# Patient Record
Sex: FEMALE | Race: WHITE | NOT HISPANIC OR LATINO
[De-identification: names, ages, dates, MRNs, and addresses within clinical notes are randomized per-mention and may not be internally consistent; named-entity substitution may affect disease eponyms.]

---

## 2017-01-23 ENCOUNTER — APPOINTMENT (OUTPATIENT)
Dept: PULMONOLOGY | Facility: CLINIC | Age: 62
End: 2017-01-23

## 2017-01-23 VITALS
HEART RATE: 62 BPM | DIASTOLIC BLOOD PRESSURE: 60 MMHG | BODY MASS INDEX: 29.82 KG/M2 | SYSTOLIC BLOOD PRESSURE: 102 MMHG | TEMPERATURE: 98.1 F | WEIGHT: 179 LBS | HEIGHT: 65 IN | RESPIRATION RATE: 14 BRPM | OXYGEN SATURATION: 98 %

## 2017-01-23 DIAGNOSIS — F51.04 PSYCHOPHYSIOLOGIC INSOMNIA: ICD-10-CM

## 2017-03-08 ENCOUNTER — APPOINTMENT (OUTPATIENT)
Dept: SLEEP CENTER | Facility: CLINIC | Age: 62
End: 2017-03-08

## 2017-04-28 ENCOUNTER — APPOINTMENT (OUTPATIENT)
Dept: SLEEP CENTER | Facility: CLINIC | Age: 62
End: 2017-04-28

## 2017-04-28 ENCOUNTER — OUTPATIENT (OUTPATIENT)
Dept: OUTPATIENT SERVICES | Facility: HOSPITAL | Age: 62
LOS: 1 days | End: 2017-04-28
Payer: COMMERCIAL

## 2017-04-28 PROCEDURE — G0399: CPT

## 2017-05-02 ENCOUNTER — RESULT REVIEW (OUTPATIENT)
Age: 62
End: 2017-05-02

## 2017-05-02 DIAGNOSIS — G47.33 OBSTRUCTIVE SLEEP APNEA (ADULT) (PEDIATRIC): ICD-10-CM

## 2017-05-18 ENCOUNTER — APPOINTMENT (OUTPATIENT)
Dept: SLEEP CENTER | Facility: CLINIC | Age: 62
End: 2017-05-18

## 2017-06-05 ENCOUNTER — APPOINTMENT (OUTPATIENT)
Dept: SLEEP CENTER | Facility: CLINIC | Age: 62
End: 2017-06-05

## 2017-06-26 ENCOUNTER — RESULT REVIEW (OUTPATIENT)
Age: 62
End: 2017-06-26

## 2017-10-26 ENCOUNTER — APPOINTMENT (OUTPATIENT)
Dept: PULMONOLOGY | Facility: CLINIC | Age: 62
End: 2017-10-26

## 2017-11-27 ENCOUNTER — APPOINTMENT (OUTPATIENT)
Dept: PULMONOLOGY | Facility: CLINIC | Age: 62
End: 2017-11-27
Payer: COMMERCIAL

## 2017-11-27 VITALS
SYSTOLIC BLOOD PRESSURE: 116 MMHG | DIASTOLIC BLOOD PRESSURE: 66 MMHG | HEART RATE: 65 BPM | RESPIRATION RATE: 15 BRPM | WEIGHT: 184 LBS | TEMPERATURE: 98.3 F | BODY MASS INDEX: 33.86 KG/M2 | HEIGHT: 62 IN

## 2017-11-27 DIAGNOSIS — G47.33 OBSTRUCTIVE SLEEP APNEA (ADULT) (PEDIATRIC): ICD-10-CM

## 2017-11-27 PROCEDURE — 99214 OFFICE O/P EST MOD 30 MIN: CPT | Mod: GC

## 2018-03-05 ENCOUNTER — RESULT REVIEW (OUTPATIENT)
Age: 63
End: 2018-03-05

## 2018-05-25 ENCOUNTER — EMERGENCY (EMERGENCY)
Facility: HOSPITAL | Age: 63
LOS: 1 days | Discharge: HOME | End: 2018-05-25
Admitting: EMERGENCY MEDICINE
Payer: COMMERCIAL

## 2018-05-25 PROCEDURE — 74019 RADEX ABDOMEN 2 VIEWS: CPT | Mod: 26

## 2018-05-25 PROCEDURE — 74019 RADEX ABDOMEN 2 VIEWS: CPT

## 2018-05-25 PROCEDURE — 99283 EMERGENCY DEPT VISIT LOW MDM: CPT

## 2018-06-01 ENCOUNTER — EMERGENCY (EMERGENCY)
Facility: HOSPITAL | Age: 63
LOS: 1 days | Discharge: HOME | End: 2018-06-01
Attending: EMERGENCY MEDICINE
Payer: COMMERCIAL

## 2018-06-01 VITALS
TEMPERATURE: 98 F | OXYGEN SATURATION: 97 % | DIASTOLIC BLOOD PRESSURE: 81 MMHG | HEART RATE: 95 BPM | SYSTOLIC BLOOD PRESSURE: 143 MMHG | RESPIRATION RATE: 16 BRPM

## 2018-06-01 VITALS
HEIGHT: 62 IN | DIASTOLIC BLOOD PRESSURE: 88 MMHG | RESPIRATION RATE: 17 BRPM | HEART RATE: 92 BPM | TEMPERATURE: 98 F | WEIGHT: 179.9 LBS | SYSTOLIC BLOOD PRESSURE: 133 MMHG | OXYGEN SATURATION: 98 %

## 2018-06-01 LAB
ALBUMIN SERPL ELPH-MCNC: 4.6 G/DL — SIGNIFICANT CHANGE UP (ref 3.3–5)
ALP SERPL-CCNC: 69 U/L — SIGNIFICANT CHANGE UP (ref 40–120)
ALT FLD-CCNC: 16 U/L — SIGNIFICANT CHANGE UP (ref 10–45)
ANION GAP SERPL CALC-SCNC: 14 MMOL/L — SIGNIFICANT CHANGE UP (ref 5–17)
APPEARANCE UR: CLEAR — SIGNIFICANT CHANGE UP
AST SERPL-CCNC: 20 U/L — SIGNIFICANT CHANGE UP (ref 10–40)
BASOPHILS # BLD AUTO: 0.1 K/UL — SIGNIFICANT CHANGE UP (ref 0–0.2)
BASOPHILS NFR BLD AUTO: 1.2 % — SIGNIFICANT CHANGE UP (ref 0–2)
BILIRUB SERPL-MCNC: 0.3 MG/DL — SIGNIFICANT CHANGE UP (ref 0.2–1.2)
BILIRUB UR-MCNC: NEGATIVE — SIGNIFICANT CHANGE UP
BUN SERPL-MCNC: 7 MG/DL — SIGNIFICANT CHANGE UP (ref 7–23)
CALCIUM SERPL-MCNC: 10.3 MG/DL — SIGNIFICANT CHANGE UP (ref 8.4–10.5)
CHLORIDE SERPL-SCNC: 96 MMOL/L — SIGNIFICANT CHANGE UP (ref 96–108)
CO2 SERPL-SCNC: 26 MMOL/L — SIGNIFICANT CHANGE UP (ref 22–31)
COLOR SPEC: YELLOW — SIGNIFICANT CHANGE UP
CREAT SERPL-MCNC: 0.81 MG/DL — SIGNIFICANT CHANGE UP (ref 0.5–1.3)
DIFF PNL FLD: NEGATIVE — SIGNIFICANT CHANGE UP
EOSINOPHIL # BLD AUTO: 0 K/UL — SIGNIFICANT CHANGE UP (ref 0–0.5)
EOSINOPHIL NFR BLD AUTO: 0.7 % — SIGNIFICANT CHANGE UP (ref 0–6)
GAS PNL BLDV: SIGNIFICANT CHANGE UP
GLUCOSE SERPL-MCNC: 113 MG/DL — HIGH (ref 70–99)
GLUCOSE UR QL: NEGATIVE — SIGNIFICANT CHANGE UP
HCT VFR BLD CALC: 40.4 % — SIGNIFICANT CHANGE UP (ref 34.5–45)
HGB BLD-MCNC: 13.5 G/DL — SIGNIFICANT CHANGE UP (ref 11.5–15.5)
KETONES UR-MCNC: NEGATIVE — SIGNIFICANT CHANGE UP
LEUKOCYTE ESTERASE UR-ACNC: NEGATIVE — SIGNIFICANT CHANGE UP
LYMPHOCYTES # BLD AUTO: 1.4 K/UL — SIGNIFICANT CHANGE UP (ref 1–3.3)
LYMPHOCYTES # BLD AUTO: 21.9 % — SIGNIFICANT CHANGE UP (ref 13–44)
MCHC RBC-ENTMCNC: 31.5 PG — SIGNIFICANT CHANGE UP (ref 27–34)
MCHC RBC-ENTMCNC: 33.4 GM/DL — SIGNIFICANT CHANGE UP (ref 32–36)
MCV RBC AUTO: 94.4 FL — SIGNIFICANT CHANGE UP (ref 80–100)
MONOCYTES # BLD AUTO: 0.4 K/UL — SIGNIFICANT CHANGE UP (ref 0–0.9)
MONOCYTES NFR BLD AUTO: 7 % — SIGNIFICANT CHANGE UP (ref 2–14)
NEUTROPHILS # BLD AUTO: 4.4 K/UL — SIGNIFICANT CHANGE UP (ref 1.8–7.4)
NEUTROPHILS NFR BLD AUTO: 69.1 % — SIGNIFICANT CHANGE UP (ref 43–77)
NITRITE UR-MCNC: NEGATIVE — SIGNIFICANT CHANGE UP
PH UR: 7 — SIGNIFICANT CHANGE UP (ref 5–8)
PLATELET # BLD AUTO: 272 K/UL — SIGNIFICANT CHANGE UP (ref 150–400)
POTASSIUM SERPL-MCNC: 4.4 MMOL/L — SIGNIFICANT CHANGE UP (ref 3.5–5.3)
POTASSIUM SERPL-SCNC: 4.4 MMOL/L — SIGNIFICANT CHANGE UP (ref 3.5–5.3)
PROT SERPL-MCNC: 8 G/DL — SIGNIFICANT CHANGE UP (ref 6–8.3)
PROT UR-MCNC: NEGATIVE — SIGNIFICANT CHANGE UP
RBC # BLD: 4.28 M/UL — SIGNIFICANT CHANGE UP (ref 3.8–5.2)
RBC # FLD: 11.2 % — SIGNIFICANT CHANGE UP (ref 10.3–14.5)
SODIUM SERPL-SCNC: 136 MMOL/L — SIGNIFICANT CHANGE UP (ref 135–145)
SP GR SPEC: 1 — LOW (ref 1.01–1.02)
UROBILINOGEN FLD QL: NEGATIVE — SIGNIFICANT CHANGE UP
WBC # BLD: 6.4 K/UL — SIGNIFICANT CHANGE UP (ref 3.8–10.5)
WBC # FLD AUTO: 6.4 K/UL — SIGNIFICANT CHANGE UP (ref 3.8–10.5)

## 2018-06-01 PROCEDURE — 80053 COMPREHEN METABOLIC PANEL: CPT

## 2018-06-01 PROCEDURE — 84295 ASSAY OF SERUM SODIUM: CPT

## 2018-06-01 PROCEDURE — 82330 ASSAY OF CALCIUM: CPT

## 2018-06-01 PROCEDURE — 83735 ASSAY OF MAGNESIUM: CPT

## 2018-06-01 PROCEDURE — 83605 ASSAY OF LACTIC ACID: CPT

## 2018-06-01 PROCEDURE — 84443 ASSAY THYROID STIM HORMONE: CPT

## 2018-06-01 PROCEDURE — 82947 ASSAY GLUCOSE BLOOD QUANT: CPT

## 2018-06-01 PROCEDURE — 74177 CT ABD & PELVIS W/CONTRAST: CPT | Mod: 26

## 2018-06-01 PROCEDURE — 85014 HEMATOCRIT: CPT

## 2018-06-01 PROCEDURE — 84132 ASSAY OF SERUM POTASSIUM: CPT

## 2018-06-01 PROCEDURE — 74177 CT ABD & PELVIS W/CONTRAST: CPT

## 2018-06-01 PROCEDURE — 81003 URINALYSIS AUTO W/O SCOPE: CPT

## 2018-06-01 PROCEDURE — 82803 BLOOD GASES ANY COMBINATION: CPT

## 2018-06-01 PROCEDURE — 99284 EMERGENCY DEPT VISIT MOD MDM: CPT

## 2018-06-01 PROCEDURE — 85027 COMPLETE CBC AUTOMATED: CPT

## 2018-06-01 PROCEDURE — 87086 URINE CULTURE/COLONY COUNT: CPT

## 2018-06-01 PROCEDURE — 82435 ASSAY OF BLOOD CHLORIDE: CPT

## 2018-06-01 RX ORDER — SENNA PLUS 8.6 MG/1
2 TABLET ORAL ONCE
Qty: 0 | Refills: 0 | Status: COMPLETED | OUTPATIENT
Start: 2018-06-01 | End: 2018-06-01

## 2018-06-01 RX ORDER — SODIUM CHLORIDE 9 MG/ML
1000 INJECTION INTRAMUSCULAR; INTRAVENOUS; SUBCUTANEOUS ONCE
Qty: 0 | Refills: 0 | Status: COMPLETED | OUTPATIENT
Start: 2018-06-01 | End: 2018-06-01

## 2018-06-01 RX ORDER — POLYETHYLENE GLYCOL 3350 17 G/17G
17 POWDER, FOR SOLUTION ORAL ONCE
Qty: 0 | Refills: 0 | Status: COMPLETED | OUTPATIENT
Start: 2018-06-01 | End: 2018-06-01

## 2018-06-01 RX ADMIN — POLYETHYLENE GLYCOL 3350 17 GRAM(S): 17 POWDER, FOR SOLUTION ORAL at 17:09

## 2018-06-01 RX ADMIN — SODIUM CHLORIDE 1000 MILLILITER(S): 9 INJECTION INTRAMUSCULAR; INTRAVENOUS; SUBCUTANEOUS at 11:56

## 2018-06-01 RX ADMIN — SENNA PLUS 2 TABLET(S): 8.6 TABLET ORAL at 17:08

## 2018-06-01 NOTE — ED PROVIDER NOTE - PROGRESS NOTE DETAILS
Dr. Wills: Surgery feels exam findings not consistent with acute appy. TSH send out. Will need to be followed up as OP. Abnormals called back by admin PA. Advised to start 2 tabs senna QHS. GI FU.

## 2018-06-01 NOTE — CONSULT NOTE ADULT - ASSESSMENT
Patient is a 62 year old female with what appears to be pelvic wall descent. CT findings reviewed with Dr. Shameka Orellana and findings as well as clinical picture are more consistent with pelvic wall decent and less likely to be appendicitis.  -follow up with Dr. Morrison as an outpatient. Call [phone]   -no surgical intervention at this time.

## 2018-06-01 NOTE — ED ADULT NURSE NOTE - OBJECTIVE STATEMENT
61 yo F arrived ambulatory from triage c/o of  abdominal pain PMH HLD, Chronic back pain. Reports abdominal cramping. Denies NVD. No  distress. No CP dizziness weakness or SOB. BS sounds + All 4Q Abdomen soft, nontender, nondistended. Reports constipation x 19 days. With minimal to no stools despite multiple laxatives. Pt reports decrease Po intake. IV line placed. Labs drawn, and sent.

## 2018-06-01 NOTE — ED PROVIDER NOTE - PLAN OF CARE
Your CT scan did not show any bowel obstruction, but it did show what appears to be pelvic wall decent and less likely appendicitis. Please follow up with Dr. Morrison as an outpatient. Please call [phone] to make an appointment for further management.   For your constipation, please take Senna 2 tabs at bedtime daily, Miralax 17gr up to 3 times a day as needed, Dulcolax 15mg daily. Please also follow a high fiber diet as well. Your CT scan did not show any bowel obstruction, but it did show what appears to be pelvic wall decent and less likely appendicitis. Please follow up with Dr. Morrison as an outpatient. Please call [phone] to make an appointment for further management.   For your constipation, please take Senna 2 tabs at bedtime daily, Miralax 17gr up to 3 times a day as needed, Dulcolax 15mg daily. Please also follow a high fiber diet as well. You can use warm water enemas or fleet enemas as needed.

## 2018-06-01 NOTE — ED ADULT NURSE NOTE - CHPI ED SYMPTOMS NEG
no chills/no abdominal distension/no fever/no hematuria/no nausea/no blood in stool/no diarrhea/no dysuria/no burning urination/no vomiting

## 2018-06-01 NOTE — ED ADULT TRIAGE NOTE - CHIEF COMPLAINT QUOTE
constipated last week after 12 days seen at [place] and was able to have a BM after that, now shes been constipated for 1 week

## 2018-06-01 NOTE — ED PROVIDER NOTE - CARE PLAN
Principal Discharge DX:	Constipation Principal Discharge DX:	Constipation  Assessment and plan of treatment:	Your CT scan did not show any bowel obstruction, but it did show what appears to be pelvic wall decent and less likely appendicitis. Please follow up with Dr. Morrison as an outpatient. Please call [phone] to make an appointment for further management.   For your constipation, please take Senna 2 tabs at bedtime daily, Miralax 17gr up to 3 times a day as needed, Dulcolax 15mg daily. Please also follow a high fiber diet as well. Principal Discharge DX:	Constipation  Assessment and plan of treatment:	Your CT scan did not show any bowel obstruction, but it did show what appears to be pelvic wall decent and less likely appendicitis. Please follow up with Dr. Morrison as an outpatient. Please call [phone] to make an appointment for further management.   For your constipation, please take Senna 2 tabs at bedtime daily, Miralax 17gr up to 3 times a day as needed, Dulcolax 15mg daily. Please also follow a high fiber diet as well. You can use warm water enemas or fleet enemas as needed.

## 2018-06-01 NOTE — ED PROVIDER NOTE - PMH
Anxiety    Chronic back pain    GERD (gastroesophageal reflux disease)    Hyperlipidemia    PAM (obstructive sleep apnea)

## 2018-06-01 NOTE — ED PROVIDER NOTE - OBJECTIVE STATEMENT
62y F hx of 63 y/o F hx of Anxiety, GERD, HLD, sleep apnea, chronic back pain and anxiety p/w constipation.  Patient reports having severe constipation for the last 19 days. Patient reports taking up to 5 laxatives family for the first 12 days. These included prune juice, dulcolax, miralax and glcerin tabs. Reports no to minimal BMs during this time. She went to the [facility] on 5/25 who gave did and abd xray showing moderate stool burden. They gave her an enema and a colonoscopy bowel prep which she reports passed through her, but with out relieving her constipation.   Patient reports over the last week, she hasn't been taking the stool softeners regularly because she feels they haven't been working. Reports decreased PO this past week due to fullness and nausea. Does acknowledge passing a few small balls of stool.   No vomiting, F/C, CP, SOB, LE swelling.   Reports increased urination as well. No melena or hematemesis.

## 2018-06-01 NOTE — CONSULT NOTE ADULT - SUBJECTIVE AND OBJECTIVE BOX
GENERAL SURGERY CONSULT NOTE     CC: 62y old Female admitted with a chief complaint of constipation    HPI: This patient is a 62y old Female presenting with constipation x 12 days and 7-9 days of decreased po intake. She has a history of constipation for several years, but never this severe. Patient visited an outside hospital earlier this week where she was treated with enemas and laxatives. The patient had liquid bowel movements consisting of only the laxatives. She denies weight loss, nausea, vomiting, fevers and chills.     PMHx:   GERD  chronic pain  Anxiety  PAM  Hyperlipidemia      PSHx: none    Medications (home): Medications (inpatient): polyethylene glycol 3350 17 Gram(s) Oral Once  senna 2 Tablet(s) Oral once  sorbitol 70%/mineral oil/magnesium hydroxide/glycerin Enema 120 milliLiter(s) Rectal Once    Medications (PRN):  Allergies    No Known Allergies  Intolerances      Social Hx:     Physical Exam  T(C): 36.9 (18 @ 10:42), Max: 36.9 (18 @ 10:42)  HR: 92 (18 @ 10:42) (92 - 92)  BP: 133/88 (18 @ 10:42) (133/88 - 133/88)  RR: 17 (18 @ 10:42) (17 - 17)  SpO2: 98% (18 @ 10:42) (98% - 98%)  Tmax: T(C): , Max: 36.9 (18 @ 10:42)      General: well developed, obese, NAD  Neuro: alert and oriented, no focal deficits, moves all extremities spontaneously  HEENT: NCAT, EOMI, anicteric, mucosa moist  Respiratory: airway patent, respirations unlabored  CVS: regular rate and rhythm  Abdomen: soft, tender in the suprapubic area, nontympanic  Extremities: no edema, sensation and movement grossly intact  Skin: warm, dry, appropriate color    Labs:                        13.5   6.4   )-----------( 272      ( 2018 11:43 )             40.4           136  |  96  |  7   ----------------------------<  113<H>  4.4   |  26  |  0.81    Ca    10.3      2018 11:43  Mg     2.1         TPro  8.0  /  Alb  4.6  /  TBili  0.3  /  DBili  x   /  AST  20  /  ALT  16  /  AlkPhos  69      Urinalysis Basic - ( 2018 11:53 )    Color: Yellow / Appearance: Clear / S.005 / pH: x  Gluc: x / Ketone: Negative  / Bili: Negative / Urobili: Negative   Blood: x / Protein: Negative / Nitrite: Negative   Leuk Esterase: Negative / RBC: x / WBC x   Sq Epi: x / Non Sq Epi: x / Bacteria: x            Imaging and other studies:  < from: CT Abdomen and Pelvis w/ Oral Cont and w/ IV Cont (18 @ 13:38) >  INTERPRETATION:  CLINICAL INFORMATION: Abdominal pain, suprapubic and in   the right lower quadrant.     COMPARISON: None.    PROCEDURE:   CT of the Abdomen and Pelvis was performed with intravenous contrast.   Intravenous contrast: 90 ml Omnipaque 350. 10 ml discarded.  Oral contrast: positive contrast was administered.  Sagittal and coronal reformats were performed.    FINDINGS:    LOWER CHEST: The visualized lung bases are clear.        LIVER: Small cyst at the dome.      BILE DUCTS: Normal caliber.  GALLBLADDER: Unremarkable.  SPLEEN: Unremarkable.  PANCREAS: Unremarkable.  ADRENALS: Unremarkable.  KIDNEYS/URETERS: No hydronephrosis.  Nofocal lesion.    BLADDER: Within normal limits.  REPRODUCTIVE ORGANS: The uterus and adnexa are within normal limits.    BOWEL: Normal in course and caliber without obstruction. An appendicolith   at the tip, which is fluid-filled and borderline in size at 7 mm.   Question of mild adjacent fatty stranding. Small hiatal hernia.  PERITONEUM/RETROPERITONEUM: No pneumoperitoneum, ascites, or   lymphadenopathy.  VESSELS:  No evidence of aortic aneurysm.      BONES/SOFT TISSUES: Osteopenia and degenerative changes. Significant   compression fracture of L1 with associated retropulsion into the spinal   canal. Compression fracture of L3 also with minimal retropulsion..    IMPRESSION:  Give focal pain in the right lower quadrant pain, findings are suggestive   of mild tip appendicitis with an associated appendicolith.  Compression fractures of L1 and L3 with associated retropulsion are   probably nonacute.  Findings discussed with DR. ROBERTS on 2018 1:59 PM with read back.                BOUCHRA GUTIERREZ M.D. ATTENDING RADIOLOGIST  This document has been electronically signed. 2018  2:01PM    < end of copied text >

## 2018-06-01 NOTE — ED PROVIDER NOTE - PHYSICAL EXAMINATION
PHYSICAL EXAM:    GENERAL: Comfortable, no acute distress   HEAD:  Normocephalic, atraumatic  EYES: EOMI, PERRLA  HEENT: Moist mucous membranes  NECK: Supple, No JVD  NERVOUS SYSTEM:  Alert & Oriented X3, Motor Strength 5/5 B/L upper and lower extremities  CHEST/LUNG: Clear to auscultation bilaterally  HEART: Regular rate and rhythm, + 2/6 systolic murmur   ABDOMEN: Soft, Bowel sounds present, +distension, + diffuse tenderness   EXTREMITIES:   No clubbing, cyanosis, or edema  MUSCULOSKELETAL: No muscle tenderness, no joint tenderness  SKIN:  warm and dry, no rash

## 2018-06-01 NOTE — ED ADULT NURSE NOTE - CHIEF COMPLAINT QUOTE
constipated last week after 12 days seen at [place] and was able to have a BM after that, now shes been constipated for 1 week

## 2018-06-01 NOTE — ED PROVIDER NOTE - ATTENDING CONTRIBUTION TO CARE
62y F hx of GERD, chronic back pain, HLD, anxiety here with co constipation. Pt states that seen PCP and seen at  ED for same. Visit to  ED was 5/25, prompted by no BM in 12 days. Had Abd XR and fleet enema, Golytely and had soft BM. Was advised to FU with GI however she cannot get appt until end of June. States that she continues to struggle w constipation despite daily Miralax, colace, bisacodyl. Reports drinking adequate fluids however dec appetite. Reports abd bloating and discomfort with eating. Has hx of  OAB but states worse lately. No weight loss. No perianal anesthesia.   Gen: WNWD NAD  HEENT: NCAT PERRL EOMI normal pharynx  Neck: supple  CV: RRR, no murmur  Lung: CTA BL  Abd: +BS soft ND suprapubic and R lower abd TTP  Ext: wwp, palp pulses, FROMx4, no cce  Neuro: A&Ox3, CN grossly intact, sensation intact, motor 5/5 throughout  Rectal: performed by resident and supervised by me, no hard stool in vault, some soft stool high up   AP: 62y F hx of GERD, chronic back pain, HLD, anxiety here with co constipation. Check labs is TSH and Calcium, lytes. IVF. CTAP due to early satiety, dec appetite, abd pain, constipation, r/o mass/obstruction. UA for UTI. Re-eval.

## 2018-06-02 LAB
CULTURE RESULTS: SIGNIFICANT CHANGE UP
SPECIMEN SOURCE: SIGNIFICANT CHANGE UP

## 2018-06-05 ENCOUNTER — APPOINTMENT (OUTPATIENT)
Dept: UROGYNECOLOGY | Facility: CLINIC | Age: 63
End: 2018-06-05
Payer: COMMERCIAL

## 2018-06-05 DIAGNOSIS — Z83.42 FAMILY HISTORY OF FAMILIAL HYPERCHOLESTEROLEMIA: ICD-10-CM

## 2018-06-05 DIAGNOSIS — Q44.6 CYSTIC DISEASE OF LIVER: ICD-10-CM

## 2018-06-05 DIAGNOSIS — Z81.8 FAMILY HISTORY OF OTHER MENTAL AND BEHAVIORAL DISORDERS: ICD-10-CM

## 2018-06-05 DIAGNOSIS — Z83.1 FAMILY HISTORY OF OTHER INFECTIOUS AND PARASITIC DISEASES: ICD-10-CM

## 2018-06-05 DIAGNOSIS — Z83.49 FAMILY HISTORY OF OTHER ENDOCRINE, NUTRITIONAL AND METABOLIC DISEASES: ICD-10-CM

## 2018-06-05 DIAGNOSIS — Z87.09 PERSONAL HISTORY OF OTHER DISEASES OF THE RESPIRATORY SYSTEM: ICD-10-CM

## 2018-06-05 DIAGNOSIS — Z86.59 PERSONAL HISTORY OF OTHER MENTAL AND BEHAVIORAL DISORDERS: ICD-10-CM

## 2018-06-05 DIAGNOSIS — I38 ENDOCARDITIS, VALVE UNSPECIFIED: ICD-10-CM

## 2018-06-05 PROCEDURE — 99204 OFFICE O/P NEW MOD 45 MIN: CPT

## 2018-06-05 RX ORDER — ATORVASTATIN CALCIUM 20 MG/1
20 TABLET, FILM COATED ORAL
Qty: 90 | Refills: 0 | Status: DISCONTINUED | COMMUNITY
Start: 2018-03-05

## 2018-06-05 RX ORDER — CLOTRIMAZOLE AND BETAMETHASONE DIPROPIONATE 10; .5 MG/G; MG/G
1-0.05 CREAM TOPICAL
Qty: 45 | Refills: 0 | Status: DISCONTINUED | COMMUNITY
Start: 2018-03-05

## 2018-06-05 RX ORDER — ATORVASTATIN CALCIUM 40 MG/1
40 TABLET, FILM COATED ORAL
Qty: 90 | Refills: 0 | Status: DISCONTINUED | COMMUNITY
Start: 2018-03-16

## 2018-06-07 ENCOUNTER — RESULT REVIEW (OUTPATIENT)
Age: 63
End: 2018-06-07

## 2018-06-07 ENCOUNTER — MEDICATION RENEWAL (OUTPATIENT)
Age: 63
End: 2018-06-07

## 2018-06-07 LAB
BACTERIA UR CULT: ABNORMAL
BILIRUB UR QL STRIP: NORMAL
CLARITY UR: CLEAR
COLLECTION METHOD: NORMAL
GLUCOSE UR-MCNC: NORMAL
HCG UR QL: 0.2 EU/DL
HGB UR QL STRIP.AUTO: NORMAL
KETONES UR-MCNC: NORMAL
LEUKOCYTE ESTERASE UR QL STRIP: NORMAL
NITRITE UR QL STRIP: NORMAL
PH UR STRIP: 5
PROT UR STRIP-MCNC: NORMAL
SP GR UR STRIP: 1.02

## 2018-06-14 ENCOUNTER — APPOINTMENT (OUTPATIENT)
Dept: SURGERY | Facility: CLINIC | Age: 63
End: 2018-06-14
Payer: COMMERCIAL

## 2018-06-14 VITALS
TEMPERATURE: 98.4 F | OXYGEN SATURATION: 98 % | HEIGHT: 62 IN | RESPIRATION RATE: 15 BRPM | BODY MASS INDEX: 32.2 KG/M2 | HEART RATE: 78 BPM | WEIGHT: 175 LBS

## 2018-06-14 VITALS — SYSTOLIC BLOOD PRESSURE: 159 MMHG | DIASTOLIC BLOOD PRESSURE: 83 MMHG

## 2018-06-14 DIAGNOSIS — Z83.79 FAMILY HISTORY OF OTHER DISEASES OF THE DIGESTIVE SYSTEM: ICD-10-CM

## 2018-06-14 DIAGNOSIS — Z82.62 FAMILY HISTORY OF OSTEOPOROSIS: ICD-10-CM

## 2018-06-14 DIAGNOSIS — K38.9 DISEASE OF APPENDIX, UNSPECIFIED: ICD-10-CM

## 2018-06-14 DIAGNOSIS — Z82.49 FAMILY HISTORY OF ISCHEMIC HEART DISEASE AND OTHER DISEASES OF THE CIRCULATORY SYSTEM: ICD-10-CM

## 2018-06-14 DIAGNOSIS — Z80.9 FAMILY HISTORY OF MALIGNANT NEOPLASM, UNSPECIFIED: ICD-10-CM

## 2018-06-14 PROBLEM — Z78.9 RARELY CONSUMES ALCOHOL: Status: ACTIVE | Noted: 2018-06-14

## 2018-06-14 PROBLEM — Z87.898 HISTORY OF HEADACHE: Status: RESOLVED | Noted: 2018-06-14 | Resolved: 2018-06-14

## 2018-06-14 PROBLEM — Z87.440 HISTORY OF URINARY TRACT INFECTION: Status: RESOLVED | Noted: 2018-06-05 | Resolved: 2018-06-14

## 2018-06-14 PROBLEM — R42 DIZZINESS: Status: ACTIVE | Noted: 2018-06-14

## 2018-06-14 PROBLEM — R32 URINARY INCONTINENCE: Status: ACTIVE | Noted: 2018-06-14

## 2018-06-14 PROCEDURE — 99204 OFFICE O/P NEW MOD 45 MIN: CPT

## 2018-06-14 RX ORDER — AMPICILLIN 500 MG/1
500 CAPSULE ORAL
Qty: 14 | Refills: 0 | Status: DISCONTINUED | COMMUNITY
Start: 2018-06-07 | End: 2018-06-14

## 2018-06-14 RX ORDER — SIMVASTATIN 80 MG/1
TABLET, FILM COATED ORAL
Refills: 0 | Status: DISCONTINUED | COMMUNITY
End: 2018-06-14

## 2018-06-15 LAB
T4 SERPL-MCNC: 7.4 UG/DL
TSH SERPL-ACNC: 1.86 UIU/ML

## 2018-06-18 ENCOUNTER — APPOINTMENT (OUTPATIENT)
Dept: ORTHOPEDIC SURGERY | Facility: CLINIC | Age: 63
End: 2018-06-18
Payer: COMMERCIAL

## 2018-06-18 DIAGNOSIS — Z87.440 PERSONAL HISTORY OF URINARY (TRACT) INFECTIONS: ICD-10-CM

## 2018-06-18 DIAGNOSIS — Z78.9 OTHER SPECIFIED HEALTH STATUS: ICD-10-CM

## 2018-06-18 DIAGNOSIS — R42 DIZZINESS AND GIDDINESS: ICD-10-CM

## 2018-06-18 DIAGNOSIS — R32 UNSPECIFIED URINARY INCONTINENCE: ICD-10-CM

## 2018-06-18 DIAGNOSIS — Z87.898 PERSONAL HISTORY OF OTHER SPECIFIED CONDITIONS: ICD-10-CM

## 2018-06-18 PROCEDURE — 99203 OFFICE O/P NEW LOW 30 MIN: CPT

## 2018-06-26 ENCOUNTER — APPOINTMENT (OUTPATIENT)
Dept: UROGYNECOLOGY | Facility: CLINIC | Age: 63
End: 2018-06-26
Payer: COMMERCIAL

## 2018-06-26 PROCEDURE — 57160 INSERT PESSARY/OTHER DEVICE: CPT

## 2018-06-29 ENCOUNTER — APPOINTMENT (OUTPATIENT)
Dept: MRI IMAGING | Facility: CLINIC | Age: 63
End: 2018-06-29
Payer: COMMERCIAL

## 2018-06-29 ENCOUNTER — APPOINTMENT (OUTPATIENT)
Dept: GASTROENTEROLOGY | Facility: CLINIC | Age: 63
End: 2018-06-29
Payer: COMMERCIAL

## 2018-06-29 ENCOUNTER — OUTPATIENT (OUTPATIENT)
Dept: OUTPATIENT SERVICES | Facility: HOSPITAL | Age: 63
LOS: 1 days | End: 2018-06-29
Payer: COMMERCIAL

## 2018-06-29 VITALS
SYSTOLIC BLOOD PRESSURE: 124 MMHG | TEMPERATURE: 97.6 F | HEART RATE: 83 BPM | DIASTOLIC BLOOD PRESSURE: 82 MMHG | OXYGEN SATURATION: 96 % | BODY MASS INDEX: 31.28 KG/M2 | RESPIRATION RATE: 15 BRPM | HEIGHT: 62 IN | WEIGHT: 170 LBS

## 2018-06-29 DIAGNOSIS — Z00.8 ENCOUNTER FOR OTHER GENERAL EXAMINATION: ICD-10-CM

## 2018-06-29 PROCEDURE — 99204 OFFICE O/P NEW MOD 45 MIN: CPT

## 2018-06-29 PROCEDURE — 72148 MRI LUMBAR SPINE W/O DYE: CPT | Mod: 26

## 2018-06-29 PROCEDURE — 72148 MRI LUMBAR SPINE W/O DYE: CPT

## 2018-07-03 ENCOUNTER — APPOINTMENT (OUTPATIENT)
Dept: GASTROENTEROLOGY | Facility: HOSPITAL | Age: 63
End: 2018-07-03

## 2018-07-03 ENCOUNTER — RESULT REVIEW (OUTPATIENT)
Age: 63
End: 2018-07-03

## 2018-07-03 ENCOUNTER — OUTPATIENT (OUTPATIENT)
Dept: OUTPATIENT SERVICES | Facility: HOSPITAL | Age: 63
LOS: 1 days | End: 2018-07-03
Payer: COMMERCIAL

## 2018-07-03 DIAGNOSIS — R14.0 ABDOMINAL DISTENSION (GASEOUS): ICD-10-CM

## 2018-07-03 PROCEDURE — 88305 TISSUE EXAM BY PATHOLOGIST: CPT | Mod: 26

## 2018-07-03 PROCEDURE — 88312 SPECIAL STAINS GROUP 1: CPT

## 2018-07-03 PROCEDURE — 88305 TISSUE EXAM BY PATHOLOGIST: CPT

## 2018-07-03 PROCEDURE — 43239 EGD BIOPSY SINGLE/MULTIPLE: CPT

## 2018-07-03 PROCEDURE — 88312 SPECIAL STAINS GROUP 1: CPT | Mod: 26

## 2018-07-05 ENCOUNTER — OUTPATIENT (OUTPATIENT)
Dept: OUTPATIENT SERVICES | Facility: HOSPITAL | Age: 63
LOS: 1 days | End: 2018-07-05
Payer: COMMERCIAL

## 2018-07-05 DIAGNOSIS — N81.11 CYSTOCELE, MIDLINE: ICD-10-CM

## 2018-07-05 DIAGNOSIS — Z01.818 ENCOUNTER FOR OTHER PREPROCEDURAL EXAMINATION: ICD-10-CM

## 2018-07-05 PROCEDURE — 57160 INSERT PESSARY/OTHER DEVICE: CPT

## 2018-07-10 ENCOUNTER — APPOINTMENT (OUTPATIENT)
Dept: UROGYNECOLOGY | Facility: CLINIC | Age: 63
End: 2018-07-10
Payer: COMMERCIAL

## 2018-07-10 PROCEDURE — 99213 OFFICE O/P EST LOW 20 MIN: CPT

## 2018-07-27 ENCOUNTER — APPOINTMENT (OUTPATIENT)
Dept: ORTHOPEDIC SURGERY | Facility: CLINIC | Age: 63
End: 2018-07-27
Payer: COMMERCIAL

## 2018-07-27 VITALS — HEART RATE: 82 BPM | SYSTOLIC BLOOD PRESSURE: 105 MMHG | DIASTOLIC BLOOD PRESSURE: 73 MMHG

## 2018-07-27 PROCEDURE — 99214 OFFICE O/P EST MOD 30 MIN: CPT

## 2018-08-07 ENCOUNTER — APPOINTMENT (OUTPATIENT)
Dept: GASTROENTEROLOGY | Facility: CLINIC | Age: 63
End: 2018-08-07

## 2018-08-08 PROBLEM — F41.9 ANXIETY DISORDER, UNSPECIFIED: Chronic | Status: ACTIVE | Noted: 2018-06-01

## 2018-08-08 PROBLEM — G47.33 OBSTRUCTIVE SLEEP APNEA (ADULT) (PEDIATRIC): Chronic | Status: ACTIVE | Noted: 2018-06-01

## 2018-08-08 PROBLEM — K21.9 GASTRO-ESOPHAGEAL REFLUX DISEASE WITHOUT ESOPHAGITIS: Chronic | Status: ACTIVE | Noted: 2018-06-01

## 2018-08-08 PROBLEM — E78.5 HYPERLIPIDEMIA, UNSPECIFIED: Chronic | Status: ACTIVE | Noted: 2018-06-01

## 2018-08-08 PROBLEM — M54.9 DORSALGIA, UNSPECIFIED: Chronic | Status: ACTIVE | Noted: 2018-06-01

## 2018-08-13 ENCOUNTER — RX RENEWAL (OUTPATIENT)
Age: 63
End: 2018-08-13

## 2018-09-10 ENCOUNTER — APPOINTMENT (OUTPATIENT)
Dept: UROGYNECOLOGY | Facility: CLINIC | Age: 63
End: 2018-09-10

## 2018-10-12 ENCOUNTER — OUTPATIENT (OUTPATIENT)
Dept: OUTPATIENT SERVICES | Facility: HOSPITAL | Age: 63
LOS: 1 days | End: 2018-10-12
Payer: COMMERCIAL

## 2018-10-12 ENCOUNTER — APPOINTMENT (OUTPATIENT)
Dept: RADIOLOGY | Facility: CLINIC | Age: 63
End: 2018-10-12
Payer: MEDICARE

## 2018-10-12 ENCOUNTER — APPOINTMENT (OUTPATIENT)
Dept: GASTROENTEROLOGY | Facility: CLINIC | Age: 63
End: 2018-10-12
Payer: MEDICARE

## 2018-10-12 DIAGNOSIS — Z00.8 ENCOUNTER FOR OTHER GENERAL EXAMINATION: ICD-10-CM

## 2018-10-12 PROCEDURE — 74018 RADEX ABDOMEN 1 VIEW: CPT

## 2018-10-12 PROCEDURE — 91112 GI WIRELESS CAPSULE MEASURE: CPT

## 2018-10-12 PROCEDURE — 70360 X-RAY EXAM OF NECK: CPT

## 2018-10-12 PROCEDURE — 74018 RADEX ABDOMEN 1 VIEW: CPT | Mod: 26

## 2018-10-12 PROCEDURE — 71045 X-RAY EXAM CHEST 1 VIEW: CPT

## 2018-10-12 PROCEDURE — 70360 X-RAY EXAM OF NECK: CPT | Mod: 26

## 2018-10-12 PROCEDURE — 71045 X-RAY EXAM CHEST 1 VIEW: CPT | Mod: 26

## 2018-10-16 ENCOUNTER — OTHER (OUTPATIENT)
Age: 63
End: 2018-10-16

## 2018-10-17 ENCOUNTER — OTHER (OUTPATIENT)
Age: 63
End: 2018-10-17

## 2018-10-18 ENCOUNTER — OTHER (OUTPATIENT)
Age: 63
End: 2018-10-18

## 2018-10-19 ENCOUNTER — RESULT REVIEW (OUTPATIENT)
Age: 63
End: 2018-10-19

## 2018-10-19 ENCOUNTER — OTHER (OUTPATIENT)
Age: 63
End: 2018-10-19

## 2018-10-22 ENCOUNTER — CLINICAL ADVICE (OUTPATIENT)
Age: 63
End: 2018-10-22

## 2018-10-23 ENCOUNTER — APPOINTMENT (OUTPATIENT)
Dept: GASTROENTEROLOGY | Facility: CLINIC | Age: 63
End: 2018-10-23
Payer: MEDICARE

## 2018-10-23 VITALS
HEIGHT: 62 IN | BODY MASS INDEX: 28.52 KG/M2 | SYSTOLIC BLOOD PRESSURE: 120 MMHG | HEART RATE: 84 BPM | TEMPERATURE: 97.4 F | WEIGHT: 155 LBS | OXYGEN SATURATION: 96 % | DIASTOLIC BLOOD PRESSURE: 70 MMHG | RESPIRATION RATE: 15 BRPM

## 2018-10-23 PROCEDURE — 99215 OFFICE O/P EST HI 40 MIN: CPT

## 2018-11-30 ENCOUNTER — APPOINTMENT (OUTPATIENT)
Dept: GASTROENTEROLOGY | Facility: CLINIC | Age: 63
End: 2018-11-30
Payer: MEDICARE

## 2018-11-30 VITALS
RESPIRATION RATE: 16 BRPM | BODY MASS INDEX: 28.14 KG/M2 | HEART RATE: 76 BPM | DIASTOLIC BLOOD PRESSURE: 78 MMHG | TEMPERATURE: 97.9 F | SYSTOLIC BLOOD PRESSURE: 126 MMHG | WEIGHT: 151 LBS | HEIGHT: 61.5 IN | OXYGEN SATURATION: 98 %

## 2018-11-30 PROCEDURE — 99214 OFFICE O/P EST MOD 30 MIN: CPT

## 2019-01-02 ENCOUNTER — APPOINTMENT (OUTPATIENT)
Dept: UROGYNECOLOGY | Facility: CLINIC | Age: 64
End: 2019-01-02
Payer: MEDICARE

## 2019-01-02 PROCEDURE — 99214 OFFICE O/P EST MOD 30 MIN: CPT

## 2019-02-15 ENCOUNTER — APPOINTMENT (OUTPATIENT)
Dept: GASTROENTEROLOGY | Facility: CLINIC | Age: 64
End: 2019-02-15
Payer: MEDICARE

## 2019-02-15 VITALS
OXYGEN SATURATION: 98 % | DIASTOLIC BLOOD PRESSURE: 72 MMHG | HEIGHT: 61.5 IN | TEMPERATURE: 98.5 F | RESPIRATION RATE: 16 BRPM | WEIGHT: 155 LBS | SYSTOLIC BLOOD PRESSURE: 126 MMHG | BODY MASS INDEX: 28.89 KG/M2 | HEART RATE: 80 BPM

## 2019-02-15 PROCEDURE — 99214 OFFICE O/P EST MOD 30 MIN: CPT

## 2019-02-20 NOTE — PHYSICAL EXAM
[General Appearance - Alert] : alert [General Appearance - In No Acute Distress] : in no acute distress [General Appearance - Well Nourished] : well nourished [Sclera] : the sclera and conjunctiva were normal [Extraocular Movements] : extraocular movements were intact [Strabismus] : no strabismus was seen [Outer Ear] : the ears and nose were normal in appearance [Examination Of The Oral Cavity] : the lips and gums were normal [Oropharynx] : the oropharynx was normal [Neck Appearance] : the appearance of the neck was normal [Thyroid Diffuse Enlargement] : the thyroid was not enlarged [Thyroid Nodule] : there were no palpable thyroid nodules [] : no respiratory distress [Exaggerated Use Of Accessory Muscles For Inspiration] : no accessory muscle use [Auscultation Breath Sounds / Voice Sounds] : lungs were clear to auscultation bilaterally [Heart Rate And Rhythm] : heart rate was normal and rhythm regular [Heart Sounds] : normal S1 and S2 [Murmurs] : no murmurs [Edema] : there was no peripheral edema [Bowel Sounds] : normal bowel sounds [Abdomen Soft] : soft [Abdomen Tenderness] : non-tender [Abdomen Mass (___ Cm)] : no abdominal mass palpated [Abnormal Walk] : normal gait [Involuntary Movements] : no involuntary movements were seen [No Focal Deficits] : no focal deficits [Impaired Insight] : insight and judgment were intact [Affect] : the affect was normal [FreeTextEntry1] : + anxious

## 2019-02-25 ENCOUNTER — RX RENEWAL (OUTPATIENT)
Age: 64
End: 2019-02-25

## 2019-03-06 ENCOUNTER — APPOINTMENT (OUTPATIENT)
Dept: NUTRITION | Facility: CLINIC | Age: 64
End: 2019-03-06
Payer: MEDICARE

## 2019-03-06 PROCEDURE — 97802 MEDICAL NUTRITION INDIV IN: CPT

## 2019-04-08 ENCOUNTER — OTHER (OUTPATIENT)
Age: 64
End: 2019-04-08

## 2019-05-02 ENCOUNTER — APPOINTMENT (OUTPATIENT)
Dept: UROGYNECOLOGY | Facility: CLINIC | Age: 64
End: 2019-05-02
Payer: MEDICARE

## 2019-05-02 DIAGNOSIS — N95.2 POSTMENOPAUSAL ATROPHIC VAGINITIS: ICD-10-CM

## 2019-05-02 PROCEDURE — A4562: CPT

## 2019-05-02 PROCEDURE — 99214 OFFICE O/P EST MOD 30 MIN: CPT | Mod: 25

## 2019-05-02 NOTE — DISCUSSION/SUMMARY
[FreeTextEntry1] : Follow up for prolapse / pesssary care.\par \par Manages with ring pessary.  Was considering vaginal hysterectomy pending healing of perineum.  Chronic constipation dependent on Linzess makes prolapse surgery efficacy of concern.  Non compliant with estrace due to cancer fears (re - educated today)\par \par Ring pessary slips out with each BM\par \par \par Re - educated on atrophy/ estrogen risks.  She has RX and will begin 3 times weekly at night.   Prefer non surgical management as has heard of problems with surgery.\par \par Changes to 2.25 short gelhorn and comfortable.  Educated.  Will return for refitting / self care teaching

## 2019-05-02 NOTE — HISTORY OF PRESENT ILLNESS
[FreeTextEntry1] : Follow up for prolapse / pesssary care.\par \par Manages with ring pessary.  Was considering vaginal hysterectomy pending healing of perineum.  Chronic constipation dependent on Linzess makes prolapse surgery efficacy of concern.  Non compliant with estrace due to cancer fears (re - educated today)\par \par Ring pessary slips out with each BM\par \par \par \par Constitutional:  No acute distress, well developed well nourished good hygiene\par \par Neuro/psych:  Orientated x2,   normal memory\par \par Respiratory:   no cough, no dypsnea\par \par Neck:    Normal appearance,   symmetrical\par \par Abdomen: soft / non tender\par \par Occult blood: Not performed\par \par Skin warm and dry,  No rash,  No lesions\par \par Musculoskeletal: l Normal gait, No involuntary movements\par \par Labia/Clitoris: Labia normal, clitoris normal\par \par External Genitalia: normal atophy\par \par Vagina: normal large cystocele, atrophy.  no discharge , erosions\par \par Vaginal Atrophy:  moderate/severe\par \par

## 2019-05-07 ENCOUNTER — OTHER (OUTPATIENT)
Age: 64
End: 2019-05-07

## 2019-05-07 ENCOUNTER — APPOINTMENT (OUTPATIENT)
Dept: GASTROENTEROLOGY | Facility: CLINIC | Age: 64
End: 2019-05-07
Payer: MEDICARE

## 2019-05-07 VITALS
TEMPERATURE: 98.2 F | RESPIRATION RATE: 16 BRPM | BODY MASS INDEX: 26.49 KG/M2 | WEIGHT: 159 LBS | HEIGHT: 65 IN | DIASTOLIC BLOOD PRESSURE: 76 MMHG | OXYGEN SATURATION: 96 % | HEART RATE: 78 BPM | SYSTOLIC BLOOD PRESSURE: 125 MMHG

## 2019-05-07 DIAGNOSIS — K59.01 SLOW TRANSIT CONSTIPATION: ICD-10-CM

## 2019-05-07 DIAGNOSIS — Z87.19 PERSONAL HISTORY OF OTHER DISEASES OF THE DIGESTIVE SYSTEM: ICD-10-CM

## 2019-05-07 PROCEDURE — 99214 OFFICE O/P EST MOD 30 MIN: CPT

## 2019-05-10 ENCOUNTER — APPOINTMENT (OUTPATIENT)
Dept: UROGYNECOLOGY | Facility: CLINIC | Age: 64
End: 2019-05-10
Payer: MEDICARE

## 2019-05-10 DIAGNOSIS — N81.11 CYSTOCELE, MIDLINE: ICD-10-CM

## 2019-05-10 PROCEDURE — 99213 OFFICE O/P EST LOW 20 MIN: CPT

## 2019-05-10 NOTE — DISCUSSION/SUMMARY
[FreeTextEntry1] : Pt happy with cube pessary. She will RTO in 2 weeks or sooner if needed. Pt agrees to call office with any problems/concerns.

## 2019-05-10 NOTE — PROCEDURE
[Refit] : refit needed [Discharge] : there is vaginal discharge [Pessary Inserted] : inserted [H2O] : H2O [None] : no bleeding [Good Fit] : fit is not good [Erosion] : no evidence of erosion [Erythema] : no erythema [Infection] : no evidence of infection [FreeTextEntry1] : GH 2 1/4 SS  --> refitted with cube #2 [de-identified] : GH pessary flipped and stem protruding into posterior wall [de-identified] : leukorrhea [de-identified] : fitted with cube #2 [FreeTextEntry8] : pt taught how to remove and insert pessary on her own. advised self-care 1-2 x week

## 2019-05-10 NOTE — HISTORY OF PRESENT ILLNESS
[FreeTextEntry1] : Pt presents to office for f/u of prolapse.  Fitted with GH pessary at last visit. She would like a pessary she can remove and insert on her own. She is unhappy with vaginal discharge and odor.

## 2019-05-14 PROBLEM — K59.01 SLOW TRANSIT CONSTIPATION: Status: ACTIVE | Noted: 2018-11-30

## 2019-05-14 PROBLEM — Z87.19 HISTORY OF DYSPHAGIA: Status: RESOLVED | Noted: 2018-10-12 | Resolved: 2019-05-14

## 2019-05-14 NOTE — HISTORY OF PRESENT ILLNESS
[FreeTextEntry1] : Last visit: 2/2019\par Plan after last visit:\par 63 yo F pmh anxiety, PAM, HL presenting for follow up of gut dysmotility manifesting as bloating, nausea, and constipation. She has had some improvement with Linzess, but stools are somewhat looser than ideal. Will cut down to 145 daily. Re-encouraged her to follow with nutrition. Also discussed possibility of EGD with botox which may be of assistance. She remains reticent to try any medications, despite the okay from cardiology.\par \par Impression:\par 1) Whole Gut Dysmotility - stomach, SI, colon\par 2) Bloating with early satiety\par 3) Constipation - Improved\par 4) N/V - Improved\par 5) Anxiety\par 6) Avg risk CRC - due in 2021 as per Colon report 2016\par 7) Esophageal ulcer\par \par Plan:\par 1) Decrease Linzess to 145 daily\par 2) Low FODMAP diet - Re-emphasized need for her to see Nutrition. Info provided\par 3) Ultimately needs EGD to eval esophageal ulcer. She is still on PPI. Can consider trial of botox to pylorus at same time.\par 4) Extended conversation regarding potential treatment options for dysmotility. She continues to prefer non invasive options at this time and is reticent for trial of any medication\par 5) Plan discussed at length with all questions answered for both patient and \par 6) RV 2 months for follow up. \par --------------------------------------------------\par Since last visit:\par Followed up with Urogyn\par Followed up with nutrition\par \par Currently:\par \par Saw Dr. Laurent Keane now in correct place and doing well\par It is not being dislodged with straining\par \par Still with constipation - but otherwise okay\par Doing well on Linzess 145\par \par Osteoporosis - they are switching up the bisphosphonate as still having progression of disease\par \par Gastroparesis:\par She continues to try dietary therapy with some improvement, but unable to comply 100%\par She still has some nausea, but not huge amounts\par Weight is stable\par Still declining any medical therapy, but she is amenable to botox\par \par Esophageal Ulcer:\par Still on PPI - giving worsening osteoporosis wants to come off PPI (we reviewed new data that suggests not associated)\par Due for repeat EGD\par \par

## 2019-05-14 NOTE — PHYSICAL EXAM
[General Appearance - In No Acute Distress] : in no acute distress [General Appearance - Alert] : alert [General Appearance - Well Developed] : well developed [General Appearance - Well Nourished] : well nourished [Extraocular Movements] : extraocular movements were intact [Sclera] : the sclera and conjunctiva were normal [Outer Ear] : the ears and nose were normal in appearance [Examination Of The Oral Cavity] : the lips and gums were normal [Neck Appearance] : the appearance of the neck was normal [Oropharynx] : the oropharynx was normal [Neck Cervical Mass (___cm)] : no neck mass was observed [Respiration, Rhythm And Depth] : normal respiratory rhythm and effort [Exaggerated Use Of Accessory Muscles For Inspiration] : no accessory muscle use [Auscultation Breath Sounds / Voice Sounds] : lungs were clear to auscultation bilaterally [Heart Rate And Rhythm] : heart rate was normal and rhythm regular [Heart Sounds] : normal S1 and S2 [Murmurs] : no murmurs [Abdomen Soft] : soft [Abdomen Tenderness] : non-tender [Bowel Sounds] : normal bowel sounds [Abdomen Mass (___ Cm)] : no abdominal mass palpated [Abnormal Walk] : normal gait [Involuntary Movements] : no involuntary movements were seen [] : no rash [No Focal Deficits] : no focal deficits [Impaired Insight] : insight and judgment were intact [Affect] : the affect was normal [FreeTextEntry1] : + anxious (baseline)

## 2019-05-14 NOTE — ASSESSMENT
[FreeTextEntry1] : 63 yo F pmh anxiety, PAM, HL, esophageal ulcer presenting for follow up.  Her constipation is improved, but not fully resolved.  Her major complaint of dislodgement of her pessary has resolved after changes made by Dr. Mancilla.  She continues to have dysmotility symptoms despite dietary intervention. She remains resistant to any medical promotility therapy, but wishes to consider botox injection.  As she is due for EGD to confirm resolution of esophageal ulceration, we can accomplish both tasks at same time.   She is very concerned about constipation and that there is an underlying malignancy given change.  I have provided reassurance that she had colon 3 years ago and the likelihood of anything sinister in colon is very low.  We will complete colonoscopy at same time as EGD given her worsening constipation and to provide further reassurance to patient.\par \par Impression:\par 1) Whole Gut Dysmotility - stomach, SI, colon\par 2) Bloating with early satiety - persistent despite dietary therapy\par 3) Constipation - Improved\par 4) N/V - Improved\par 5) Anxiety\par 6) Avg risk CRC - due in 2021 as per Colon report 2016\par 7) Esophageal ulcer - due for repeat\par \par Plan:\par 1) EGD+/- Botox injection at same time as Colonoscopy.  Risks, benefits, and limitations of procedures reviewed at length.\par 2) Continue with Low FODMAP diet and nutrition based diet\par 3) Decrease to daily PPI - will see if can ween off after endoscopy\par 4) Plan discussed at length with all questions answered for patient.  Plan agreed upon\par 5) RV 2 weeks after testing

## 2019-05-24 ENCOUNTER — APPOINTMENT (OUTPATIENT)
Dept: UROGYNECOLOGY | Facility: CLINIC | Age: 64
End: 2019-05-24

## 2019-06-10 ENCOUNTER — OTHER (OUTPATIENT)
Age: 64
End: 2019-06-10

## 2019-06-17 ENCOUNTER — OTHER (OUTPATIENT)
Age: 64
End: 2019-06-17

## 2019-06-19 ENCOUNTER — APPOINTMENT (OUTPATIENT)
Dept: GASTROENTEROLOGY | Facility: HOSPITAL | Age: 64
End: 2019-06-19

## 2019-06-20 ENCOUNTER — OTHER (OUTPATIENT)
Age: 64
End: 2019-06-20

## 2019-07-10 ENCOUNTER — APPOINTMENT (OUTPATIENT)
Dept: GASTROENTEROLOGY | Facility: HOSPITAL | Age: 64
End: 2019-07-10

## 2019-07-10 ENCOUNTER — RESULT REVIEW (OUTPATIENT)
Age: 64
End: 2019-07-10

## 2019-07-10 ENCOUNTER — OUTPATIENT (OUTPATIENT)
Dept: OUTPATIENT SERVICES | Facility: HOSPITAL | Age: 64
LOS: 1 days | End: 2019-07-10
Payer: COMMERCIAL

## 2019-07-10 DIAGNOSIS — K59.00 CONSTIPATION, UNSPECIFIED: ICD-10-CM

## 2019-07-10 DIAGNOSIS — K22.10 ULCER OF ESOPHAGUS WITHOUT BLEEDING: ICD-10-CM

## 2019-07-10 DIAGNOSIS — R10.9 UNSPECIFIED ABDOMINAL PAIN: ICD-10-CM

## 2019-07-10 PROCEDURE — 43236 UPPR GI SCOPE W/SUBMUC INJ: CPT

## 2019-07-10 PROCEDURE — 88312 SPECIAL STAINS GROUP 1: CPT

## 2019-07-10 PROCEDURE — 88305 TISSUE EXAM BY PATHOLOGIST: CPT | Mod: 26

## 2019-07-10 PROCEDURE — 43239 EGD BIOPSY SINGLE/MULTIPLE: CPT

## 2019-07-10 PROCEDURE — 45378 DIAGNOSTIC COLONOSCOPY: CPT

## 2019-07-10 PROCEDURE — 43236 UPPR GI SCOPE W/SUBMUC INJ: CPT | Mod: XS

## 2019-07-10 PROCEDURE — 88312 SPECIAL STAINS GROUP 1: CPT | Mod: 26

## 2019-07-10 PROCEDURE — 88305 TISSUE EXAM BY PATHOLOGIST: CPT

## 2019-07-12 ENCOUNTER — APPOINTMENT (OUTPATIENT)
Dept: UROGYNECOLOGY | Facility: CLINIC | Age: 64
End: 2019-07-12

## 2019-07-15 ENCOUNTER — OTHER (OUTPATIENT)
Age: 64
End: 2019-07-15

## 2019-07-18 ENCOUNTER — OTHER (OUTPATIENT)
Age: 64
End: 2019-07-18

## 2019-07-19 ENCOUNTER — OTHER (OUTPATIENT)
Age: 64
End: 2019-07-19

## 2019-07-30 ENCOUNTER — APPOINTMENT (OUTPATIENT)
Dept: UROGYNECOLOGY | Facility: CLINIC | Age: 64
End: 2019-07-30

## 2019-08-20 ENCOUNTER — APPOINTMENT (OUTPATIENT)
Dept: GASTROENTEROLOGY | Facility: CLINIC | Age: 64
End: 2019-08-20
Payer: MEDICARE

## 2019-08-20 VITALS
OXYGEN SATURATION: 96 % | WEIGHT: 158 LBS | HEART RATE: 110 BPM | TEMPERATURE: 97.5 F | RESPIRATION RATE: 15 BRPM | DIASTOLIC BLOOD PRESSURE: 82 MMHG | BODY MASS INDEX: 26.33 KG/M2 | SYSTOLIC BLOOD PRESSURE: 138 MMHG | HEIGHT: 65 IN

## 2019-08-20 DIAGNOSIS — Z87.19 PERSONAL HISTORY OF OTHER DISEASES OF THE DIGESTIVE SYSTEM: ICD-10-CM

## 2019-08-20 DIAGNOSIS — R14.0 ABDOMINAL DISTENSION (GASEOUS): ICD-10-CM

## 2019-08-20 DIAGNOSIS — Z87.898 PERSONAL HISTORY OF OTHER SPECIFIED CONDITIONS: ICD-10-CM

## 2019-08-20 PROCEDURE — 99214 OFFICE O/P EST MOD 30 MIN: CPT

## 2019-08-20 RX ORDER — PANTOPRAZOLE 40 MG/1
40 TABLET, DELAYED RELEASE ORAL DAILY
Qty: 30 | Refills: 5 | Status: DISCONTINUED | COMMUNITY
Start: 2019-02-15 | End: 2019-08-20

## 2019-08-23 PROBLEM — Z87.898 HISTORY OF ABDOMINAL PAIN: Status: RESOLVED | Noted: 2018-06-14 | Resolved: 2019-08-23

## 2019-08-23 PROBLEM — Z87.19 HISTORY OF APPENDICITIS: Status: RESOLVED | Noted: 2018-06-14 | Resolved: 2019-08-23

## 2019-08-23 PROBLEM — Z87.898 HISTORY OF NAUSEA AND VOMITING: Status: RESOLVED | Noted: 2018-07-10 | Resolved: 2019-08-23

## 2019-08-23 PROBLEM — Z87.19 HISTORY OF ESOPHAGEAL ULCER: Status: RESOLVED | Noted: 2019-02-15 | Resolved: 2019-08-23

## 2019-08-23 PROBLEM — R14.0 ABDOMINAL BLOATING: Status: ACTIVE | Noted: 2018-06-14

## 2019-08-23 NOTE — PHYSICAL EXAM
[General Appearance - In No Acute Distress] : in no acute distress [General Appearance - Alert] : alert [General Appearance - Well Nourished] : well nourished [General Appearance - Well Developed] : well developed [General Appearance - Well-Appearing] : healthy appearing [Extraocular Movements] : extraocular movements were intact [Sclera] : the sclera and conjunctiva were normal [Outer Ear] : the ears and nose were normal in appearance [Oropharynx] : the oropharynx was normal [Examination Of The Oral Cavity] : the lips and gums were normal [Neck Cervical Mass (___cm)] : no neck mass was observed [Neck Appearance] : the appearance of the neck was normal [Thyroid Nodule] : there were no palpable thyroid nodules [Thyroid Diffuse Enlargement] : the thyroid was not enlarged [Respiration, Rhythm And Depth] : normal respiratory rhythm and effort [Exaggerated Use Of Accessory Muscles For Inspiration] : no accessory muscle use [Heart Rate And Rhythm] : heart rate was normal and rhythm regular [Auscultation Breath Sounds / Voice Sounds] : lungs were clear to auscultation bilaterally [Heart Sounds] : normal S1 and S2 [Murmurs] : no murmurs [Bowel Sounds] : normal bowel sounds [Abdomen Soft] : soft [Abdomen Tenderness] : non-tender [Abdomen Mass (___ Cm)] : no abdominal mass palpated [Abnormal Walk] : normal gait [Involuntary Movements] : no involuntary movements were seen [Skin Color & Pigmentation] : normal skin color and pigmentation [] : no rash [No Focal Deficits] : no focal deficits [Impaired Insight] : insight and judgment were intact [FreeTextEntry1] : + minimally anxious (baseline) [Affect] : the affect was normal

## 2019-08-23 NOTE — REVIEW OF SYSTEMS
[Feeling Poorly] : not feeling poorly [Feeling Tired] : not feeling tired [Recent Weight Loss (___ Lbs)] : no recent weight loss [As Noted in HPI] : as noted in HPI [Negative] : Heme/Lymph

## 2019-08-23 NOTE — HISTORY OF PRESENT ILLNESS
[FreeTextEntry1] : Follow up: May 2019\par Plan after last visit:\par Constipation (564.00) (K59.00)\par Abdominal bloating (787.3) (R14.0)\par Esophageal ulcer (530.20) (K22.10)\par Slow transit constipation (564.01) (K59.01)\par Gastrointestinal dysmotility (536.9) (K92.89)\par \par 61 yo F pmh anxiety, PAM, HL, esophageal ulcer presenting for follow up. Her constipation is improved, but not fully resolved. Her major complaint of dislodgement of her pessary has resolved after changes made by Dr. Mancilla. She continues to have dysmotility symptoms despite dietary intervention. She remains resistant to any medical promotility therapy, but wishes to consider botox injection. As she is due for EGD to confirm resolution of esophageal ulceration, we can accomplish both tasks at same time. She is very concerned about constipation and that there is an underlying malignancy given change. I have provided reassurance that she had colon 3 years ago and the likelihood of anything sinister in colon is very low. We will complete colonoscopy at same time as EGD given her worsening constipation and to provide further reassurance to patient.\par \par Impression:\par 1) Whole Gut Dysmotility - stomach, SI, colon\par 2) Bloating with early satiety - persistent despite dietary therapy\par 3) Constipation - Improved\par 4) N/V - Improved\par 5) Anxiety\par 6) Avg risk CRC - due in 2021 as per Colon report 2016\par 7) Esophageal ulcer - due for repeat\par \par Plan:\par 1) EGD+/- Botox injection at same time as Colonoscopy. Risks, benefits, and limitations of procedures reviewed at length.\par 2) Continue with Low FODMAP diet and nutrition based diet\par 3) Decrease to daily PPI - will see if can ween off after endoscopy\par 4) Plan discussed at length with all questions answered for patient. Plan agreed upon\par 5) RV 2 weeks after testing. \par -------------------------------------------------------------------------------\par After EGD with botox\par - Has been able to decrease linzess dosing\par - Still has bloating\par - Feels pretty good overall\par \par Constipation:\par She is now controlled on Linzess every other day\par Wishes to keep weening down\par While the botox may have helped (2/2 gastro-colic reflex improving), I advised that she maintain on these meds\par No straining, hemorrhoids, blood in stool, n/v\par \par Bloating:\par Still there\par Still with some dietary indiscretions, but overall on Low FODMAP diet\par No visible bloating\par \par Esophageal Ulcer - resolved\par \par ----------------------------------------------------------------------\par Previous Workup:\par Colonoscopy:\par Diverticulosis\par \par EGD\par 2 cm HH\par Schatzki Ring\par Gastric erythema\par Botox to pylorus

## 2019-08-23 NOTE — ASSESSMENT
[FreeTextEntry1] : 65 yo F pmh anxiety, PAM, HL, esophageal ulcer presenting for follow up of her diffuse dysmotility.  This has improved with combination of dietary therapy, Botox injection to pylorus, and Linzess.  She is doing well at this time and wishes to attempt to ween to minimal amount of medication possible.  I have reminded her that botox to pylorus is temporary and that she would be best served staying on the medication.\par \par Impression:\par 1) Whole Gut Dysmotility - stomach, SI, colon\par 2) Bloating with early satiety - present, but improved\par 3) Constipation - Improved\par 4) N/V - Improved\par 5) Anxiety\par 6) CRC Screening - Due 2024\par 7) Esophageal ulcer - resolved\par \par Plan:\par 1) Continue with Lifestyle and Dietary adjustments\par 2) Continue with Linzess, would not go below every other day.  Can use Miralax in between doses as well to assist if needed\par 3) Ultimately may benefit from G-POEM, but given pan- dysmotility, may be limited.  She will consider if symptoms return\par 4) Daily PPI - can ween down as esophageal ulcer has healed\par 5) Plan discussed at length with all questions answered for patient. Plan agreed upon\par 6) RV 3 months

## 2019-10-14 ENCOUNTER — OTHER (OUTPATIENT)
Age: 64
End: 2019-10-14

## 2019-11-26 ENCOUNTER — APPOINTMENT (OUTPATIENT)
Dept: GASTROENTEROLOGY | Facility: CLINIC | Age: 64
End: 2019-11-26
Payer: MEDICARE

## 2019-11-26 VITALS
RESPIRATION RATE: 16 BRPM | TEMPERATURE: 97.3 F | BODY MASS INDEX: 25.66 KG/M2 | SYSTOLIC BLOOD PRESSURE: 118 MMHG | DIASTOLIC BLOOD PRESSURE: 60 MMHG | HEART RATE: 97 BPM | HEIGHT: 65 IN | WEIGHT: 154 LBS | OXYGEN SATURATION: 97 %

## 2019-11-26 PROCEDURE — 99214 OFFICE O/P EST MOD 30 MIN: CPT

## 2019-11-27 ENCOUNTER — OTHER (OUTPATIENT)
Age: 64
End: 2019-11-27

## 2019-12-10 NOTE — PHYSICAL EXAM
[General Appearance - In No Acute Distress] : in no acute distress [General Appearance - Alert] : alert [General Appearance - Well Nourished] : well nourished [Extraocular Movements] : extraocular movements were intact [Sclera] : the sclera and conjunctiva were normal [Outer Ear] : the ears and nose were normal in appearance [Examination Of The Oral Cavity] : the lips and gums were normal [Oropharynx] : the oropharynx was normal [Neck Appearance] : the appearance of the neck was normal [Neck Cervical Mass (___cm)] : no neck mass was observed [Respiration, Rhythm And Depth] : normal respiratory rhythm and effort [Exaggerated Use Of Accessory Muscles For Inspiration] : no accessory muscle use [Auscultation Breath Sounds / Voice Sounds] : lungs were clear to auscultation bilaterally [Heart Rate And Rhythm] : heart rate was normal and rhythm regular [Heart Sounds] : normal S1 and S2 [Murmurs] : no murmurs [Bowel Sounds] : normal bowel sounds [Abdomen Soft] : soft [Abdomen Tenderness] : non-tender [Abdomen Mass (___ Cm)] : no abdominal mass palpated [Involuntary Movements] : no involuntary movements were seen [Abnormal Walk] : normal gait [Skin Color & Pigmentation] : normal skin color and pigmentation [No Focal Deficits] : no focal deficits [] : no rash [FreeTextEntry1] : aox3 [Impaired Insight] : insight and judgment were intact [Affect] : the affect was normal

## 2019-12-10 NOTE — REVIEW OF SYSTEMS
[As Noted in HPI] : as noted in HPI [Anxiety] : anxiety [Depression] : no depression [Negative] : Heme/Lymph

## 2019-12-10 NOTE — HISTORY OF PRESENT ILLNESS
[FreeTextEntry1] : Last visit: 8/2019\par Plan after last visit:\par Abdominal bloating (787.3) (R14.0)\par Anxiety (300.00) (F41.9)\par Chronic GERD (530.81) (K21.9)\par Constipation (564.00) (K59.00)\par History of esophageal ulcer (V12.79) (Z87.19)\par Gastrointestinal dysmotility (536.9) (K92.89)\par \par 65 yo F pmh anxiety, PAM, HL, esophageal ulcer presenting for follow up of her diffuse dysmotility. This has improved with combination of dietary therapy, Botox injection to pylorus, and LINZESS. She is doing well at this time and wishes to attempt to ween to minimal amount of medication possible. I have reminded her that Botox to pylorus is temporary and that she would be best served staying on the medication.\par \par Impression:\par 1) Whole Gut Dysmotility - stomach, SI, colon\par 2) Bloating with early satiety - present, but improved\par 3) Constipation - Improved\par 4) N/V - Improved\par 5) Anxiety\par 6) CRC Screening - Due 2024\par 7) Esophageal ulcer - resolved\par \par Plan:\par 1) Continue with Lifestyle and Dietary adjustments\par 2) Continue with LINZESS, would not go below every other day. Can use Miralax in between doses as well to assist if needed\par 3) Ultimately may benefit from G-POEM, but given pan- dysmotility, may be limited. She will consider if symptoms return\par 4) Daily PPI - can ween down as esophageal ulcer has healed\par 5) Plan discussed at length with all questions answered for patient. Plan agreed upon\par 6) RV 3 months. \par -------------------------------------------------------------------------------\par \par Is having some post prandial regurgitation and some food intermittently\par Nausea is not as much a problem - still improved since Botox\par She is now willing to consider GPOEM, but is not eager\par Has been somewhat consistent with dietary therapy - but wishes to see dietician again \par \par There is some new dysphagia to solids only\par Feels caught up and slow transit in mid chest\par She is wondering if the rings seen on last endoscopy may be playing role\par Still with some regurgitation as well\par \par \par Constipation is slightly worse as well after she decreased LINZESS to every 3rd or 4th day.  I have encouraged her to take at least every other day if not daily

## 2019-12-10 NOTE — ASSESSMENT
[FreeTextEntry1] : 65 yo F pmh anxiety, PAM, HL, esophageal ulcer presenting for follow up of her diffuse dysmotility.  She has new dysphagia to solids - next step is dilation of previously seen Schatzki rings.  We will increase her LINZESS at this time given bowel habits and no improvement in overall symptoms (bloating, distension, etc) will send for G POEM eval as well (as Botox likely wearing off)\par \par Impression:\par 1) Whole Gut Dysmotility - stomach, SI, colon\par 2) Bloating with early satiety - present, but improved\par 3) Constipation - Improved\par 4) Dysphagia to solids in setting of Schatzki Ring\par 5) N/V - Improved\par 6) Anxiety\par 7) CRC Screening - Due 2024\par \par Plan:\par 1) EGD with Savary Dilation of Rings\par 2) Increase LINZESS to every day or every other day\par 3) Continue with Lifestyle and Dietary adjustments - suggested she follow with dietician again\par 4) G ROSELYNEM eval with Dr. Morales\par 5) Daily PPI - refill sent in today\par 6) Plan discussed at length with all questions answered for patient. Plan agreed upon\par 7) RV 3 months after all the above - she will call in if LINZESS is not improving constipation

## 2020-02-05 ENCOUNTER — APPOINTMENT (OUTPATIENT)
Dept: FAMILY MEDICINE | Facility: CLINIC | Age: 65
End: 2020-02-05
Payer: MEDICARE

## 2020-02-05 ENCOUNTER — APPOINTMENT (OUTPATIENT)
Dept: GASTROENTEROLOGY | Facility: HOSPITAL | Age: 65
End: 2020-02-05

## 2020-02-05 VITALS
BODY MASS INDEX: 31.8 KG/M2 | OXYGEN SATURATION: 98 % | SYSTOLIC BLOOD PRESSURE: 126 MMHG | HEART RATE: 103 BPM | DIASTOLIC BLOOD PRESSURE: 88 MMHG | HEIGHT: 60 IN | WEIGHT: 162 LBS | TEMPERATURE: 98.1 F

## 2020-02-05 VITALS
DIASTOLIC BLOOD PRESSURE: 88 MMHG | HEIGHT: 60 IN | HEART RATE: 103 BPM | RESPIRATION RATE: 16 BRPM | TEMPERATURE: 98.1 F | BODY MASS INDEX: 31.8 KG/M2 | SYSTOLIC BLOOD PRESSURE: 126 MMHG | OXYGEN SATURATION: 98 % | WEIGHT: 162 LBS

## 2020-02-05 DIAGNOSIS — R13.10 DYSPHAGIA, UNSPECIFIED: ICD-10-CM

## 2020-02-05 DIAGNOSIS — M85.80 OTHER SPECIFIED DISORDERS OF BONE DENSITY AND STRUCTURE, UNSPECIFIED SITE: ICD-10-CM

## 2020-02-05 PROCEDURE — 99204 OFFICE O/P NEW MOD 45 MIN: CPT

## 2020-02-05 RX ORDER — LINACLOTIDE 290 UG/1
290 CAPSULE, GELATIN COATED ORAL
Qty: 30 | Refills: 1 | Status: DISCONTINUED | COMMUNITY
Start: 2018-11-30 | End: 2020-02-05

## 2020-02-05 RX ORDER — LINACLOTIDE 145 UG/1
145 CAPSULE, GELATIN COATED ORAL
Qty: 30 | Refills: 3 | Status: DISCONTINUED | COMMUNITY
Start: 2019-02-25 | End: 2020-02-05

## 2020-02-05 RX ORDER — SIMETHICONE 80 MG/1
80 TABLET, CHEWABLE ORAL
Qty: 120 | Refills: 2 | Status: DISCONTINUED | COMMUNITY
Start: 2018-08-13 | End: 2020-02-05

## 2020-02-05 RX ORDER — LORATADINE 10 MG
TABLET,DISINTEGRATING ORAL
Refills: 0 | Status: DISCONTINUED | COMMUNITY
End: 2020-02-05

## 2020-02-05 RX ORDER — DOCUSATE SODIUM 100 MG/1
100 CAPSULE ORAL
Qty: 90 | Refills: 1 | Status: DISCONTINUED | COMMUNITY
Start: 2018-10-23 | End: 2020-02-05

## 2020-02-05 RX ORDER — RANITIDINE 150 MG/1
150 TABLET ORAL
Qty: 60 | Refills: 5 | Status: DISCONTINUED | COMMUNITY
Start: 2019-08-20 | End: 2020-02-05

## 2020-02-05 RX ORDER — MECLIZINE HYDROCHLORIDE 25 MG/1
25 TABLET ORAL
Refills: 0 | Status: DISCONTINUED | COMMUNITY
End: 2020-02-05

## 2020-02-05 RX ORDER — SENNOSIDES 8.6 MG/1
CAPSULE, GELATIN COATED ORAL
Refills: 0 | Status: DISCONTINUED | COMMUNITY
End: 2020-02-05

## 2020-02-05 RX ORDER — RANITIDINE 150 MG/1
150 TABLET ORAL
Refills: 0 | Status: DISCONTINUED | COMMUNITY
End: 2020-02-05

## 2020-02-05 RX ORDER — ARIPIPRAZOLE 2 MG/1
TABLET ORAL
Refills: 0 | Status: DISCONTINUED | COMMUNITY
End: 2020-02-05

## 2020-02-05 RX ORDER — METOCLOPRAMIDE 5 MG/1
5 TABLET ORAL
Qty: 90 | Refills: 2 | Status: DISCONTINUED | COMMUNITY
Start: 2018-10-23 | End: 2020-02-05

## 2020-02-05 NOTE — HISTORY OF PRESENT ILLNESS
[FreeTextEntry8] : Here to establish. She has esophageal rings. She needs injection placed for her osteoporosis management. She has urinary incontinence. c/o burning in vaginal area and has bladder prolapse, pelvic floor weakness. She took Amoxicillin for urinary burning which helped.

## 2020-02-05 NOTE — PLAN
[FreeTextEntry1] : Cardiology, endocrine referral.\par  anxiety: stress management, psychiatry for BZD refill. f/u for cpe. \par GI and Urogyn referral.\par

## 2020-02-05 NOTE — PHYSICAL EXAM
[Well Nourished] : well nourished [No Acute Distress] : no acute distress [Well-Appearing] : well-appearing [Well Developed] : well developed [Normal Sclera/Conjunctiva] : normal sclera/conjunctiva [PERRL] : pupils equal round and reactive to light [Normal Outer Ear/Nose] : the outer ears and nose were normal in appearance [EOMI] : extraocular movements intact [Normal Oropharynx] : the oropharynx was normal [No Lymphadenopathy] : no lymphadenopathy [No JVD] : no jugular venous distention [Thyroid Normal, No Nodules] : the thyroid was normal and there were no nodules present [Supple] : supple [No Respiratory Distress] : no respiratory distress  [No Accessory Muscle Use] : no accessory muscle use [Clear to Auscultation] : lungs were clear to auscultation bilaterally [Normal Rate] : normal rate  [Normal S1, S2] : normal S1 and S2 [Regular Rhythm] : with a regular rhythm [No Carotid Bruits] : no carotid bruits [No Murmur] : no murmur heard [No Abdominal Bruit] : a ~M bruit was not heard ~T in the abdomen [No Varicosities] : no varicosities [Pedal Pulses Present] : the pedal pulses are present [No Palpable Aorta] : no palpable aorta [No Edema] : there was no peripheral edema [Non Tender] : non-tender [No Extremity Clubbing/Cyanosis] : no extremity clubbing/cyanosis [Soft] : abdomen soft [Non-distended] : non-distended [No Masses] : no abdominal mass palpated [No HSM] : no HSM [Normal Bowel Sounds] : normal bowel sounds [Normal Anterior Cervical Nodes] : no anterior cervical lymphadenopathy [Normal Posterior Cervical Nodes] : no posterior cervical lymphadenopathy [No Joint Swelling] : no joint swelling [No Spinal Tenderness] : no spinal tenderness [No CVA Tenderness] : no CVA  tenderness [No Rash] : no rash [Grossly Normal Strength/Tone] : grossly normal strength/tone [Coordination Grossly Intact] : coordination grossly intact [No Focal Deficits] : no focal deficits [Normal Gait] : normal gait [Deep Tendon Reflexes (DTR)] : deep tendon reflexes were 2+ and symmetric [Normal Affect] : the affect was normal [Normal Insight/Judgement] : insight and judgment were intact

## 2020-02-05 NOTE — HEALTH RISK ASSESSMENT
[Yes] : Yes [4 or more  times a week (4 pts)] : 4 or more  times a week (4 points) [1 or 2 (0 pts)] : 1 or 2 (0 points) [No] : In the past 12 months have you used drugs other than those required for medical reasons? No [Never (0 pts)] : Never (0 points) [No falls in past year] : Patient reported no falls in the past year [1] : 2) Feeling down, depressed, or hopeless for several days (1) [] : No [Audit-CScore] : 4 [[id number]] : 2 [de-identified] : regular

## 2020-02-06 ENCOUNTER — APPOINTMENT (OUTPATIENT)
Dept: RHEUMATOLOGY | Facility: CLINIC | Age: 65
End: 2020-02-06
Payer: MEDICARE

## 2020-02-06 VITALS — DIASTOLIC BLOOD PRESSURE: 82 MMHG | RESPIRATION RATE: 14 BRPM | SYSTOLIC BLOOD PRESSURE: 122 MMHG | HEART RATE: 88 BPM

## 2020-02-06 PROCEDURE — 96372 THER/PROPH/DIAG INJ SC/IM: CPT

## 2020-02-06 PROCEDURE — 99204 OFFICE O/P NEW MOD 45 MIN: CPT | Mod: 25

## 2020-02-06 RX ADMIN — ROMOSOZUMAB-AQQG 0 MG/1.17ML: 105 INJECTION, SOLUTION SUBCUTANEOUS at 00:00

## 2020-02-06 NOTE — REASON FOR VISIT
[Initial Evaluation] : an initial evaluation [FreeTextEntry1] : osteoporosis with multiple spinal compression fx, Evenity administration

## 2020-02-06 NOTE — HISTORY OF PRESENT ILLNESS
[FreeTextEntry1] : INGRID BURRIS is a 64 year old woman who presents with advanced OP as seen on MRI with multiple atraumatic spinal compression fx with severe loss of height. Used Fosamax in the past but has esophageal motility issues which preclude it presently, trialled one dose of Prolia but had subsequent fracture, so transitioned to Evenity SQ with prior Endo, Dr Constantine Cabrera x 5 doses now. Last 2 administered 12/9/19 and 1/8/20 respectively at [facility] without any issues. Here today for next set of injections. No F/C, recent infections, feels well, no CP, SOB. Pain in back is improving slowly, has been wearing a back brace. No recent falls. \par \par + strong FH of severe OP with fragility fx, no tobacco, recurrent steroids for bronchitis, no inflammatory arthritis\par \par + OA with nodules over DIPs in b/l hands which are painful, inquiring about possible meds as cannot take PO NSAIDs 2/2 GI issues.

## 2020-02-06 NOTE — PHYSICAL EXAM
[General Appearance - Alert] : alert [General Appearance - In No Acute Distress] : in no acute distress [General Appearance - Well Nourished] : well nourished [Sclera] : the sclera and conjunctiva were normal [PERRL With Normal Accommodation] : pupils were equal in size, round, and reactive to light [Extraocular Movements] : extraocular movements were intact [Oropharynx] : the oropharynx was normal [Neck Appearance] : the appearance of the neck was normal [Heart Rate And Rhythm] : heart rate was normal and rhythm regular [Auscultation Breath Sounds / Voice Sounds] : lungs were clear to auscultation bilaterally [Edema] : there was no peripheral edema [Heart Sounds] : normal S1 and S2 [Bowel Sounds] : normal bowel sounds [Abdomen Soft] : soft [Abdomen Tenderness] : non-tender [No CVA Tenderness] : no ~M costovertebral angle tenderness [] : no rash [No Focal Deficits] : no focal deficits [Impaired Insight] : insight and judgment were intact [Oriented To Time, Place, And Person] : oriented to person, place, and time [Affect] : the affect was normal [FreeTextEntry1] : Brouchards and Heberdens nodes over b/l hands with TTP, no synovitis

## 2020-02-06 NOTE — DATA REVIEWED
[FreeTextEntry1] : Available notes, and pertinent labs & imaging in EMR reviewed. Paper records reviewed, scanned to EMR. Pertinent labs and imaging summarized in HPI. Pertinent labs and imaging summarized in HPI.

## 2020-02-06 NOTE — REVIEW OF SYSTEMS
[Negative] : Heme/Lymph [Arthralgias] : arthralgias [As Noted in HPI] : as noted in HPI [Joint Pain] : joint pain

## 2020-02-06 NOTE — PROCEDURE
[Today's Date:] : Date: [unfilled] [FreeTextEntry1] : Pt feels well today, no recent infections, fevers/chills. Evenity 105mg/1.17mL prefilled syringe x 2 administered subcutaneously into L thigh after sites cleansed with alcohol. Pt tolerated well, no leakage of medication from injection site, dressed with Band-Aid. \par \par Pt educated that site may be sore post-injection, she could experience mild malaise. Advised to call office if develops any pain, swelling, redness at site, new fevers or chills. \par \par Lot [id number], Exp 09/21 -- patients own medication

## 2020-02-07 RX ORDER — ROMOSOZUMAB-AQQG 105 MG/1.17ML
105 INJECTION, SOLUTION SUBCUTANEOUS
Qty: 0 | Refills: 0 | Status: COMPLETED | OUTPATIENT
Start: 2020-02-06

## 2020-02-07 RX ORDER — DICLOFENAC SODIUM 10 MG/G
1 GEL TOPICAL
Qty: 1 | Refills: 5 | Status: ACTIVE | COMMUNITY
Start: 2020-02-06

## 2020-02-26 ENCOUNTER — APPOINTMENT (OUTPATIENT)
Dept: FAMILY MEDICINE | Facility: CLINIC | Age: 65
End: 2020-02-26

## 2020-03-06 ENCOUNTER — APPOINTMENT (OUTPATIENT)
Dept: RHEUMATOLOGY | Facility: CLINIC | Age: 65
End: 2020-03-06
Payer: MEDICARE

## 2020-03-06 VITALS
HEART RATE: 96 BPM | TEMPERATURE: 97.8 F | OXYGEN SATURATION: 98 % | SYSTOLIC BLOOD PRESSURE: 117 MMHG | HEIGHT: 60 IN | WEIGHT: 162 LBS | DIASTOLIC BLOOD PRESSURE: 72 MMHG | BODY MASS INDEX: 31.8 KG/M2

## 2020-03-06 DIAGNOSIS — S52.509A UNSPECIFIED FRACTURE OF THE LOWER END OF UNSPECIFIED RADIUS, INITIAL ENCOUNTER FOR CLOSED FRACTURE: ICD-10-CM

## 2020-03-06 DIAGNOSIS — M15.9 POLYOSTEOARTHRITIS, UNSPECIFIED: ICD-10-CM

## 2020-03-06 PROCEDURE — 96372 THER/PROPH/DIAG INJ SC/IM: CPT | Mod: 59

## 2020-03-06 PROCEDURE — 99214 OFFICE O/P EST MOD 30 MIN: CPT | Mod: 25

## 2020-03-06 RX ORDER — ROMOSOZUMAB-AQQG 105 MG/1.17ML
105 INJECTION, SOLUTION SUBCUTANEOUS
Qty: 0 | Refills: 0 | Status: COMPLETED | OUTPATIENT
Start: 2020-03-06

## 2020-03-06 RX ADMIN — ROMOSOZUMAB-AQQG 0 MG/1.17ML: 105 INJECTION, SOLUTION SUBCUTANEOUS at 00:00

## 2020-03-06 NOTE — PROCEDURE
[Today's Date:] : Date: [unfilled] [FreeTextEntry1] : Pt feels well today, no recent infections, fevers/chills. Evenity 105mg/1.17mL prefilled syringe x 2 administered subcutaneously into R thigh after sites cleansed with alcohol. Pt tolerated well, no leakage of medication from injection site, dressed with Band-Aid. \par \par Pt educated that site may be sore post-injection, she could experience mild malaise. Advised to call office if develops any pain, swelling, redness at site, new fevers or chills. \par \par Lot [id number], Exp 06/22 -- patients own medication

## 2020-03-06 NOTE — REASON FOR VISIT
[Follow-Up: _____] : a [unfilled] follow-up visit [Initial Evaluation] : an initial evaluation [FreeTextEntry1] : osteoporosis with multiple spinal compression fx, Evenity administration

## 2020-03-06 NOTE — ASSESSMENT
[FreeTextEntry1] : INGRID BURRIS is a 64 year old woman with severe OP by MRI with multiple compression fractures and no metabolic etiology on extensive w/u with prior endocrinologist. Unable to take PO bisphosphonates 2/2 esophageal issues, fractured on Prolia, has been on Evenity x 6 doses so far, tolerated last dose well. + OA in fingers and unable to take PO NSAIDs. \par \par - 7th dose administered today, tolerated well\par - c/w Ca/Vit D supplementation-- Ca 600mg BID with food, Vit D 1000 IU daily \par - labs ordered today based on recommended labs from prior endo as well as routine labs \par - c/w voltaren gel for OA \par - [employer] handicap permit forms completed \par - RTC in 1 month for next set of injections, pt to bring own stock from her pharmacy

## 2020-03-06 NOTE — DATA REVIEWED
[FreeTextEntry1] : Available notes, and pertinent labs & imaging in EMR reviewed. Pertinent imaging summarized in HPI.

## 2020-03-06 NOTE — PHYSICAL EXAM
[General Appearance - Alert] : alert [General Appearance - In No Acute Distress] : in no acute distress [General Appearance - Well Nourished] : well nourished [Sclera] : the sclera and conjunctiva were normal [Oropharynx] : the oropharynx was normal [Neck Appearance] : the appearance of the neck was normal [Auscultation Breath Sounds / Voice Sounds] : lungs were clear to auscultation bilaterally [Heart Rate And Rhythm] : heart rate was normal and rhythm regular [Heart Sounds] : normal S1 and S2 [Edema] : there was no peripheral edema [No CVA Tenderness] : no ~M costovertebral angle tenderness [] : no rash [No Focal Deficits] : no focal deficits [Oriented To Time, Place, And Person] : oriented to person, place, and time [Impaired Insight] : insight and judgment were intact [Affect] : the affect was normal [Bowel Sounds] : normal bowel sounds [Abdomen Soft] : soft [FreeTextEntry1] : Brouchards and Heberdens nodes over b/l hands with TTP, no synovitis

## 2020-04-03 ENCOUNTER — APPOINTMENT (OUTPATIENT)
Dept: RHEUMATOLOGY | Facility: CLINIC | Age: 65
End: 2020-04-03

## 2020-04-03 ENCOUNTER — APPOINTMENT (OUTPATIENT)
Dept: RHEUMATOLOGY | Facility: CLINIC | Age: 65
End: 2020-04-03
Payer: MEDICARE

## 2020-04-03 ENCOUNTER — APPOINTMENT (OUTPATIENT)
Dept: FAMILY MEDICINE | Facility: CLINIC | Age: 65
End: 2020-04-03
Payer: MEDICARE

## 2020-04-03 VITALS
DIASTOLIC BLOOD PRESSURE: 84 MMHG | TEMPERATURE: 98.2 F | RESPIRATION RATE: 14 BRPM | SYSTOLIC BLOOD PRESSURE: 142 MMHG | HEART RATE: 88 BPM | OXYGEN SATURATION: 98 %

## 2020-04-03 VITALS
TEMPERATURE: 98.3 F | HEART RATE: 88 BPM | DIASTOLIC BLOOD PRESSURE: 84 MMHG | OXYGEN SATURATION: 94 % | SYSTOLIC BLOOD PRESSURE: 142 MMHG

## 2020-04-03 DIAGNOSIS — R03.0 ELEVATED BLOOD-PRESSURE READING, W/OUT DIAGNOSIS OF HYPERTENSION: ICD-10-CM

## 2020-04-03 DIAGNOSIS — H10.30 UNSPECIFIED ACUTE CONJUNCTIVITIS, UNSPECIFIED EYE: ICD-10-CM

## 2020-04-03 PROCEDURE — 96372 THER/PROPH/DIAG INJ SC/IM: CPT | Mod: 59

## 2020-04-03 PROCEDURE — 99214 OFFICE O/P EST MOD 30 MIN: CPT

## 2020-04-03 RX ORDER — ROMOSOZUMAB-AQQG 105 MG/1.17ML
105 INJECTION, SOLUTION SUBCUTANEOUS
Refills: 0 | Status: COMPLETED | OUTPATIENT
Start: 2020-04-03

## 2020-04-03 RX ADMIN — ROMOSOZUMAB-AQQG 0 MG/1.17ML: 105 INJECTION, SOLUTION SUBCUTANEOUS at 00:00

## 2020-04-03 NOTE — PROCEDURE
[Today's Date:] : Date: [unfilled] [Therapeutic] : therapeutic [FreeTextEntry1] : Pt feels well today, no recent infections, fevers/chills. Evenity 105mg/1.17mL prefilled syringe x 2 administered subcutaneously into L thigh after sites cleansed with alcohol. Pt tolerated well, no leakage of medication from injection site, dressed with Band-Aid. \par \par Pt educated that site may be sore post-injection, she could experience mild malaise. Advised to call office if develops any pain, swelling, redness at site, new fevers or chills. \par \par Lot [id number], Exp 06/22 -- patients own medication

## 2020-04-03 NOTE — HEALTH RISK ASSESSMENT
[No] : In the past 12 months have you used drugs other than those required for medical reasons? No [No falls in past year] : Patient reported no falls in the past year [0] : 2) Feeling down, depressed, or hopeless: Not at all (0) [] : No [[id number]] : 0

## 2020-04-03 NOTE — PHYSICAL EXAM
[No Acute Distress] : no acute distress [Well Nourished] : well nourished [Well Developed] : well developed [Well-Appearing] : well-appearing [PERRL] : pupils equal round and reactive to light [EOMI] : extraocular movements intact [Normal Outer Ear/Nose] : the outer ears and nose were normal in appearance [Normal Oropharynx] : the oropharynx was normal [No JVD] : no jugular venous distention [No Lymphadenopathy] : no lymphadenopathy [Supple] : supple [Thyroid Normal, No Nodules] : the thyroid was normal and there were no nodules present [No Respiratory Distress] : no respiratory distress  [No Accessory Muscle Use] : no accessory muscle use [Clear to Auscultation] : lungs were clear to auscultation bilaterally [Normal Rate] : normal rate  [Regular Rhythm] : with a regular rhythm [Normal S1, S2] : normal S1 and S2 [No Murmur] : no murmur heard [No Carotid Bruits] : no carotid bruits [No Abdominal Bruit] : a ~M bruit was not heard ~T in the abdomen [No Varicosities] : no varicosities [Pedal Pulses Present] : the pedal pulses are present [No Edema] : there was no peripheral edema [No Palpable Aorta] : no palpable aorta [No Extremity Clubbing/Cyanosis] : no extremity clubbing/cyanosis [Normal Posterior Cervical Nodes] : no posterior cervical lymphadenopathy [Normal Anterior Cervical Nodes] : no anterior cervical lymphadenopathy [No Rash] : no rash [Normal Gait] : normal gait [Deep Tendon Reflexes (DTR)] : deep tendon reflexes were 2+ and symmetric [Normal Affect] : the affect was normal [de-identified] : conjunctival redness, right eyelid crusting

## 2020-04-03 NOTE — PLAN
[FreeTextEntry1] : hand washing, lid hygiene.\par antibiotic eye drops. \par elevated BP: pt. anxious, continue to monitor BP.

## 2020-04-03 NOTE — ASSESSMENT
[FreeTextEntry1] : INGRID BURRIS is a 64 year old woman with severe OP by MRI with multiple compression fractures and no metabolic etiology on extensive w/u with prior endocrinologist. Unable to take PO bisphosphonates 2/2 esophageal issues, fractured on Prolia, has been on Evenity, tolerating well. \par \par - monthly dose administered today, tolerated well\par - c/w Ca/Vit D supplementation-- Ca 600mg BID with food, Vit D 1000 IU daily \par - labs ordered at last visit based on recommended labs from prior endo as well as routine labs -- will have done when restrictions on nonurgent labs are lifted \par - RTC in 1 month for next set of injections, pt to bring own stock from her pharmacy

## 2020-04-03 NOTE — HISTORY OF PRESENT ILLNESS
[FreeTextEntry8] : patient c/o conjunctival redness, right eye crusting in mornings. Symptom onset a month ago. No fever, chills. felt dizzy this morning. No resp. symptoms. \par She feels anxious.

## 2020-04-28 ENCOUNTER — APPOINTMENT (OUTPATIENT)
Dept: GASTROENTEROLOGY | Facility: CLINIC | Age: 65
End: 2020-04-28

## 2020-05-05 ENCOUNTER — APPOINTMENT (OUTPATIENT)
Dept: RHEUMATOLOGY | Facility: CLINIC | Age: 65
End: 2020-05-05
Payer: MEDICARE

## 2020-05-05 VITALS
HEART RATE: 76 BPM | TEMPERATURE: 98.3 F | WEIGHT: 168.25 LBS | SYSTOLIC BLOOD PRESSURE: 144 MMHG | OXYGEN SATURATION: 98 % | HEIGHT: 60 IN | BODY MASS INDEX: 33.03 KG/M2 | DIASTOLIC BLOOD PRESSURE: 87 MMHG

## 2020-05-05 PROCEDURE — 96372 THER/PROPH/DIAG INJ SC/IM: CPT

## 2020-05-05 RX ORDER — ROMOSOZUMAB-AQQG 105 MG/1.17ML
105 INJECTION, SOLUTION SUBCUTANEOUS
Refills: 0 | Status: COMPLETED | OUTPATIENT
Start: 2020-05-05

## 2020-05-05 RX ADMIN — ROMOSOZUMAB-AQQG 0 MG/1.17ML: 105 INJECTION, SOLUTION SUBCUTANEOUS at 00:00

## 2020-05-05 NOTE — PROCEDURE
[Therapeutic] : therapeutic [Today's Date:] : Date: [unfilled] [FreeTextEntry1] : Pt feels well today, no recent infections, fevers/chills. Evenity 105mg/1.17mL prefilled syringe x 2 administered subcutaneously into L thigh after sites cleansed with alcohol. Pt tolerated well, no leakage of medication from injection site, dressed with Band-Aid. \par \par Pt educated that site may be sore post-injection, she could experience mild malaise. Advised to call office if develops any pain, swelling, redness at site, new fevers or chills. \par \par Lot [id number], Exp 06/22 -- patients own medication

## 2020-05-05 NOTE — ASSESSMENT
[FreeTextEntry1] : INGRID BURRIS is a 64 year old woman with severe OP by MRI with multiple compression fractures and no metabolic etiology on extensive w/u with prior endocrinologist. Unable to take PO bisphosphonates 2/2 esophageal issues, fractured on Prolia, has been on Evenity, tolerating well. \par \par - monthly dose administered today, tolerated well\par - c/w Ca/Vit D supplementation-- Ca 600mg BID with food, Vit D 1000 IU daily \par - labs -- will have done when restrictions on nonurgent labs are lifted \par - mild dysuria today in setting of bladder prolapse -- check UA, UCx\par - RTC in 1 month for next set of injections, pt to bring own stock from her pharmacy

## 2020-06-04 ENCOUNTER — APPOINTMENT (OUTPATIENT)
Dept: RHEUMATOLOGY | Facility: CLINIC | Age: 65
End: 2020-06-04
Payer: MEDICARE

## 2020-06-04 VITALS
HEART RATE: 80 BPM | TEMPERATURE: 98.4 F | RESPIRATION RATE: 14 BRPM | SYSTOLIC BLOOD PRESSURE: 142 MMHG | DIASTOLIC BLOOD PRESSURE: 86 MMHG

## 2020-06-04 PROCEDURE — 96372 THER/PROPH/DIAG INJ SC/IM: CPT | Mod: 59

## 2020-06-04 RX ORDER — ROMOSOZUMAB-AQQG 105 MG/1.17ML
105 INJECTION, SOLUTION SUBCUTANEOUS
Refills: 0 | Status: COMPLETED | OUTPATIENT
Start: 2020-06-04

## 2020-06-04 RX ADMIN — ROMOSOZUMAB-AQQG 0 MG/1.17ML: 105 INJECTION, SOLUTION SUBCUTANEOUS at 00:00

## 2020-06-04 NOTE — ASSESSMENT
[FreeTextEntry1] : INGRID BURRIS is a 64 year old woman with severe OP by MRI with multiple compression fractures and no metabolic etiology on extensive w/u with prior endocrinologist. Unable to take PO bisphosphonates 2/2 esophageal issues, fractured on Prolia, has been on Evenity, tolerating well. \par \par - monthly dose administered today, tolerated well\par - c/w Ca/Vit D supplementation-- Ca 600mg BID with food, Vit D 1000 IU daily \par - labs -- will have done when restrictions on nonurgent labs are lifted \par - RTC in 1 month for next set of injections, pt to bring own stock from her pharmacy

## 2020-06-04 NOTE — PROCEDURE
[Today's Date:] : Date: [unfilled] [Therapeutic] : therapeutic [FreeTextEntry1] : Pt feels well today, no recent infections, fevers/chills. Evenity 105mg/1.17mL prefilled syringe x 2 administered subcutaneously into L thigh after sites cleansed with alcohol. Pt tolerated well, no leakage of medication from injection site, dressed with Band-Aid. \par \par Pt educated that site may be sore post-injection, she could experience mild malaise. Advised to call office if develops any pain, swelling, redness at site, new fevers or chills. \par \par Lot [id number], Exp 09/22 -- patients own medication

## 2020-06-16 ENCOUNTER — APPOINTMENT (OUTPATIENT)
Dept: GASTROENTEROLOGY | Facility: CLINIC | Age: 65
End: 2020-06-16

## 2020-06-25 ENCOUNTER — NON-APPOINTMENT (OUTPATIENT)
Age: 65
End: 2020-06-25

## 2020-06-25 ENCOUNTER — APPOINTMENT (OUTPATIENT)
Dept: FAMILY MEDICINE | Facility: CLINIC | Age: 65
End: 2020-06-25
Payer: MEDICARE

## 2020-06-25 VITALS
HEART RATE: 105 BPM | DIASTOLIC BLOOD PRESSURE: 85 MMHG | RESPIRATION RATE: 14 BRPM | TEMPERATURE: 98.4 F | WEIGHT: 169 LBS | BODY MASS INDEX: 33.18 KG/M2 | HEIGHT: 60 IN | OXYGEN SATURATION: 98 % | SYSTOLIC BLOOD PRESSURE: 149 MMHG

## 2020-06-25 DIAGNOSIS — Z23 ENCOUNTER FOR IMMUNIZATION: ICD-10-CM

## 2020-06-25 DIAGNOSIS — K21.9 GASTRO-ESOPHAGEAL REFLUX DISEASE W/OUT ESOPHAGITIS: ICD-10-CM

## 2020-06-25 DIAGNOSIS — K92.89 OTHER SPECIFIED DISEASES OF THE DIGESTIVE SYSTEM: ICD-10-CM

## 2020-06-25 DIAGNOSIS — K59.00 CONSTIPATION, UNSPECIFIED: ICD-10-CM

## 2020-06-25 DIAGNOSIS — N81.9 FEMALE GENITAL PROLAPSE, UNSPECIFIED: ICD-10-CM

## 2020-06-25 PROCEDURE — 36415 COLL VENOUS BLD VENIPUNCTURE: CPT

## 2020-06-25 PROCEDURE — G0438: CPT

## 2020-06-25 PROCEDURE — 93000 ELECTROCARDIOGRAM COMPLETE: CPT

## 2020-06-25 NOTE — PHYSICAL EXAM
[No Acute Distress] : no acute distress [Well Nourished] : well nourished [Normal Sclera/Conjunctiva] : normal sclera/conjunctiva [Well-Appearing] : well-appearing [Well Developed] : well developed [PERRL] : pupils equal round and reactive to light [EOMI] : extraocular movements intact [Normal Outer Ear/Nose] : the outer ears and nose were normal in appearance [Normal Oropharynx] : the oropharynx was normal [Normal TMs] : both tympanic membranes were normal [No Lymphadenopathy] : no lymphadenopathy [Supple] : supple [No JVD] : no jugular venous distention [Thyroid Normal, No Nodules] : the thyroid was normal and there were no nodules present [No Respiratory Distress] : no respiratory distress  [No Accessory Muscle Use] : no accessory muscle use [Clear to Auscultation] : lungs were clear to auscultation bilaterally [Normal Rate] : normal rate  [Regular Rhythm] : with a regular rhythm [Normal S1, S2] : normal S1 and S2 [No Murmur] : no murmur heard [No Abdominal Bruit] : a ~M bruit was not heard ~T in the abdomen [No Carotid Bruits] : no carotid bruits [No Varicosities] : no varicosities [Pedal Pulses Present] : the pedal pulses are present [No Palpable Aorta] : no palpable aorta [No Edema] : there was no peripheral edema [Soft] : abdomen soft [No Extremity Clubbing/Cyanosis] : no extremity clubbing/cyanosis [Non Tender] : non-tender [Normal Bowel Sounds] : normal bowel sounds [Normal Posterior Cervical Nodes] : no posterior cervical lymphadenopathy [Normal Anterior Cervical Nodes] : no anterior cervical lymphadenopathy [No CVA Tenderness] : no CVA  tenderness [Kyphosis] : kyphosis [No Rash] : no rash [Coordination Grossly Intact] : coordination grossly intact [No Focal Deficits] : no focal deficits [Normal Gait] : normal gait [Normal Affect] : the affect was normal [Deep Tendon Reflexes (DTR)] : deep tendon reflexes were 2+ and symmetric [Alert and Oriented x3] : oriented to person, place, and time [Normal Insight/Judgement] : insight and judgment were intact [de-identified] : 20/20 left eye,20/30 right eye [de-identified] : GYN [de-identified] : obese [FreeTextEntry1] : GYN [de-identified] : diffuse joint pain [de-identified] : Anxiety

## 2020-06-25 NOTE — ASSESSMENT
[FreeTextEntry1] : Assessment and plan:\par \par 1.  Comprehensive health maintenance physical exam shows that the patient has advanced degenerative joint disease, osteoporosis needs to wear a back brace gait slightly unsteady at times secondary to pain of lower extremities.\par \par 2.  Patient has severe generalized anxiety disorder up until now she was seeing a psychiatrist she will be changing her psychiatrist so for this time I will write her Klonopin.  I stop checked it has always been the same doctor that is been writing her medications there is no sign of abuse.\par \par 3.  Patient does have multiple underlying medical problems and sees multiple specialist.\par \par 4.  Patient has follow-up with her cardiologist her electrocardiogram is abnormal I have no old electrocardiogram to compare so my recommendations are that the patient follow-up with her cardiologist.\par \par 5.  Comprehensive blood work obtained at this visit.

## 2020-06-25 NOTE — REVIEW OF SYSTEMS
[Fatigue] : fatigue [Nausea] : nausea [Constipation] : constipation [Dysuria] : dysuria [Incontinence] : incontinence [Heartburn] : heartburn [Vomiting] : vomiting [Nocturia] : nocturia [Frequency] : frequency [Joint Stiffness] : joint stiffness [Joint Pain] : joint pain [Back Pain] : back pain [Insomnia] : insomnia [Anxiety] : anxiety [Negative] : Neurological [Hematuria] : no hematuria [Vaginal Discharge] : no vaginal discharge [Joint Swelling] : no joint swelling [Muscle Weakness] : no muscle weakness [Muscle Pain] : no muscle pain [Suicidal] : not suicidal [Depression] : no depression [FreeTextEntry7] : cared for by GI

## 2020-06-25 NOTE — HEALTH RISK ASSESSMENT
[Fair] :  ~his/her~ mood as fair [Yes] : Yes [2 - 3 times a week (3 pts)] : 2 - 3  times a week (3 points) [1 or 2 (0 pts)] : 1 or 2 (0 points) [Never (0 pts)] : Never (0 points) [No] : In the past 12 months have you used drugs other than those required for medical reasons? No [One fall no injury in past year] : Patient reported one fall in the past year without injury [1] : 1) Little interest or pleasure doing things for several days (1) [None] : None [With Family] : lives with family [# of Members in Household ___] :  household currently consist of [unfilled] member(s) [Retired] : retired [College] : College [] :  [# Of Children ___] : has [unfilled] children [Fully functional (using the telephone, shopping, preparing meals, housekeeping, doing laundry, using] : Fully functional and needs no help or supervision to perform IADLs (using the telephone, shopping, preparing meals, housekeeping, doing laundry, using transportation, managing medications and managing finances) [Fully functional (bathing, dressing, toileting, transferring, walking, feeding)] : Fully functional (bathing, dressing, toileting, transferring, walking, feeding) [Feels Safe at Home] : Feels safe at home [With Patient/Caregiver] : With Patient/Caregiver [Designated Healthcare Proxy] : Designated healthcare proxy [Relationship: ___] : Relationship: [unfilled] [] : No [Audit-CScore] : 3 [[id number]] : 2 [Change in mental status noted] : No change in mental status noted [Language] : denies difficulty with language [Behavior] : denies difficulty with behavior [Learning/Retaining New Information] : denies difficulty learning/retaining new information [Handling Complex Tasks] : denies difficulty handling complex tasks [Reasoning] : denies difficulty with reasoning [Spatial Ability and Orientation] : denies difficulty with spatial ability and orientation [AdvancecareDate] : 06/20

## 2020-06-25 NOTE — COUNSELING
[Adequate lighting] : Adequate lighting [Fall prevention counseling provided] : Fall prevention counseling provided [No throw rugs] : No throw rugs [Use proper foot wear] : Use proper foot wear [Use recommended devices] : Use recommended devices [Behavioral health counseling provided] : Behavioral health counseling provided [Sleep ___ hours/day] : Sleep [unfilled] hours/day [Engage in a relaxing activity] : Engage in a relaxing activity [AUDIT-C Screening administered and reviewed] : AUDIT-C Screening administered and reviewed [Plan in advance] : Plan in advance [Potential consequences of obesity discussed] : Potential consequences of obesity discussed [Benefits of weight loss discussed] : Benefits of weight loss discussed [Structured Weight Management Program suggested:] : Structured weight management program suggested [Encouraged to maintain food diary] : Encouraged to maintain food diary [Encouraged to use exercise tracking device] : Encouraged to use exercise tracking device [Encouraged to increase physical activity] : Encouraged to increase physical activity [Decrease Portions] : decrease portions [Weigh Self Weekly] : weigh self weekly [____ min/wk Activity] : [unfilled] min/wk activity [Keep Food Diary] : keep food diary [Patient motivation] : Patient motivation [Good understanding] : Patient has a good understanding of lifestyle changes and steps needed to achieve self management goal [FreeTextEntry1] : cane

## 2020-06-25 NOTE — HISTORY OF PRESENT ILLNESS
[FreeTextEntry1] : Please see HPI. [de-identified] : Patient is a 64-year-old female who presents today for annual wellness exam patient does have multiple complaints she has a history of chronic gastroesophageal reflux disease constipation dysuria secondary to genital prolapse patient is seen on a regular basis by a uro-gynecologist unfortunately due to the pandemic patient has not been able to be seen patient also has history of multiple compression fractures with chronic back pain and gastrointestinal dysmotility.

## 2020-06-26 LAB
ALBUMIN SERPL ELPH-MCNC: 4.8 G/DL
ALP BLD-CCNC: 63 U/L
ALT SERPL-CCNC: 14 U/L
ANION GAP SERPL CALC-SCNC: 16 MMOL/L
AST SERPL-CCNC: 18 U/L
BASOPHILS # BLD AUTO: 0.07 K/UL
BASOPHILS NFR BLD AUTO: 1.1 %
BILIRUB SERPL-MCNC: 0.4 MG/DL
BUN SERPL-MCNC: 17 MG/DL
CALCIUM SERPL-MCNC: 10 MG/DL
CHLORIDE SERPL-SCNC: 101 MMOL/L
CHOLEST SERPL-MCNC: 209 MG/DL
CHOLEST/HDLC SERPL: 2.2 RATIO
CO2 SERPL-SCNC: 23 MMOL/L
CREAT SERPL-MCNC: 0.8 MG/DL
EOSINOPHIL # BLD AUTO: 0.08 K/UL
EOSINOPHIL NFR BLD AUTO: 1.3 %
ESTIMATED AVERAGE GLUCOSE: 111 MG/DL
GLUCOSE SERPL-MCNC: 103 MG/DL
HBA1C MFR BLD HPLC: 5.5 %
HCT VFR BLD CALC: 40.5 %
HDLC SERPL-MCNC: 94 MG/DL
HGB BLD-MCNC: 13.2 G/DL
IMM GRANULOCYTES NFR BLD AUTO: 0.2 %
LDLC SERPL CALC-MCNC: 96 MG/DL
LYMPHOCYTES # BLD AUTO: 1.71 K/UL
LYMPHOCYTES NFR BLD AUTO: 27.2 %
MAN DIFF?: NORMAL
MCHC RBC-ENTMCNC: 30.7 PG
MCHC RBC-ENTMCNC: 32.6 GM/DL
MCV RBC AUTO: 94.2 FL
MONOCYTES # BLD AUTO: 0.44 K/UL
MONOCYTES NFR BLD AUTO: 7 %
NEUTROPHILS # BLD AUTO: 3.97 K/UL
NEUTROPHILS NFR BLD AUTO: 63.2 %
PLATELET # BLD AUTO: 252 K/UL
POTASSIUM SERPL-SCNC: 4.4 MMOL/L
PROT SERPL-MCNC: 7.6 G/DL
RBC # BLD: 4.3 M/UL
RBC # FLD: 12.9 %
SODIUM SERPL-SCNC: 140 MMOL/L
T4 FREE SERPL-MCNC: 1.2 NG/DL
TRIGL SERPL-MCNC: 96 MG/DL
TSH SERPL-ACNC: 1.24 UIU/ML
WBC # FLD AUTO: 6.28 K/UL

## 2020-07-02 ENCOUNTER — APPOINTMENT (OUTPATIENT)
Dept: RHEUMATOLOGY | Facility: CLINIC | Age: 65
End: 2020-07-02
Payer: MEDICARE

## 2020-07-02 VITALS
OXYGEN SATURATION: 97 % | TEMPERATURE: 98.1 F | BODY MASS INDEX: 33.18 KG/M2 | HEIGHT: 60 IN | WEIGHT: 169 LBS | HEART RATE: 111 BPM

## 2020-07-02 DIAGNOSIS — R94.31 ABNORMAL ELECTROCARDIOGRAM [ECG] [EKG]: ICD-10-CM

## 2020-07-02 PROCEDURE — 96372 THER/PROPH/DIAG INJ SC/IM: CPT

## 2020-07-02 RX ORDER — ROMOSOZUMAB-AQQG 105 MG/1.17ML
105 INJECTION, SOLUTION SUBCUTANEOUS
Refills: 0 | Status: COMPLETED | OUTPATIENT
Start: 2020-07-02

## 2020-07-02 RX ADMIN — ROMOSOZUMAB-AQQG 0 MG/1.17ML: 105 INJECTION, SOLUTION SUBCUTANEOUS at 00:00

## 2020-08-06 ENCOUNTER — APPOINTMENT (OUTPATIENT)
Dept: RHEUMATOLOGY | Facility: CLINIC | Age: 65
End: 2020-08-06

## 2020-08-11 ENCOUNTER — APPOINTMENT (OUTPATIENT)
Dept: RHEUMATOLOGY | Facility: CLINIC | Age: 65
End: 2020-08-11
Payer: MEDICARE

## 2020-08-11 ENCOUNTER — OUTPATIENT (OUTPATIENT)
Dept: OUTPATIENT SERVICES | Facility: HOSPITAL | Age: 65
LOS: 1 days | End: 2020-08-11
Payer: COMMERCIAL

## 2020-08-11 ENCOUNTER — LABORATORY RESULT (OUTPATIENT)
Age: 65
End: 2020-08-11

## 2020-08-11 ENCOUNTER — APPOINTMENT (OUTPATIENT)
Dept: RADIOLOGY | Facility: HOSPITAL | Age: 65
End: 2020-08-11
Payer: MEDICARE

## 2020-08-11 ENCOUNTER — RESULT REVIEW (OUTPATIENT)
Age: 65
End: 2020-08-11

## 2020-08-11 VITALS
DIASTOLIC BLOOD PRESSURE: 86 MMHG | TEMPERATURE: 97.8 F | SYSTOLIC BLOOD PRESSURE: 120 MMHG | WEIGHT: 169 LBS | BODY MASS INDEX: 22.89 KG/M2 | HEART RATE: 108 BPM | HEIGHT: 72 IN

## 2020-08-11 DIAGNOSIS — Z00.8 ENCOUNTER FOR OTHER GENERAL EXAMINATION: ICD-10-CM

## 2020-08-11 DIAGNOSIS — M25.551 PAIN IN RIGHT HIP: ICD-10-CM

## 2020-08-11 DIAGNOSIS — S32.009A UNSPECIFIED FRACTURE OF UNSPECIFIED LUMBAR VERTEBRA, INITIAL ENCOUNTER FOR CLOSED FRACTURE: ICD-10-CM

## 2020-08-11 DIAGNOSIS — M25.552 PAIN IN RIGHT HIP: ICD-10-CM

## 2020-08-11 DIAGNOSIS — M47.816 SPONDYLOSIS W/OUT MYELOPATHY OR RADICULOPATHY, LUMBAR REGION: ICD-10-CM

## 2020-08-11 PROCEDURE — 73522 X-RAY EXAM HIPS BI 3-4 VIEWS: CPT | Mod: 26

## 2020-08-11 PROCEDURE — 73522 X-RAY EXAM HIPS BI 3-4 VIEWS: CPT

## 2020-08-11 PROCEDURE — 96372 THER/PROPH/DIAG INJ SC/IM: CPT

## 2020-08-11 RX ORDER — ROMOSOZUMAB-AQQG 105 MG/1.17ML
105 INJECTION, SOLUTION SUBCUTANEOUS
Refills: 0 | Status: COMPLETED | OUTPATIENT
Start: 2020-08-11

## 2020-08-11 RX ADMIN — ROMOSOZUMAB-AQQG 0 MG/1.17ML: 105 INJECTION, SOLUTION SUBCUTANEOUS at 00:00

## 2020-08-11 NOTE — REASON FOR VISIT
[Procedure: _________] : a [unfilled] procedure visit [FreeTextEntry1] : Evenity administration for osteoporosis

## 2020-08-11 NOTE — ASSESSMENT
[FreeTextEntry1] : INGRID BURRIS is a 65 year old woman with severe OP by MRI with multiple compression fractures and no metabolic etiology on extensive w/u with prior endocrinologist. DEXA with only osteopenia in setting of spinal DJD which is affecting the read thus unreliable as follow-up imaging for BMD assessment. Unable to take PO bisphosphonates 2/2 esophageal issues, fractured on Prolia,  thus Evenity chosen for bone building ability, has now completed recommended course of 12 months s/p today's injection. \par \par - monthly dose administered today, tolerated well\par - c/w Ca/Vit D supplementation-- Ca 600mg BID with food, Vit D 1000 IU daily \par - labs reordered today, check now to evaluate if any futher issues\par - repeat MRI L spine to check BMD and compression fx interval change since 2018, will use this to determine further OP treatment. If further declines, would recommend Forteo, if stabilization of BMD on Evenity, can consider transition to Prolia. \par - check XR pelvis/hips 2/2 worsening of chronic hip pain recently \par - Will review MRI and lab results and discuss RTC with her

## 2020-08-11 NOTE — PROCEDURE
[Today's Date:] : Date: [unfilled] [Therapeutic] : therapeutic [FreeTextEntry1] : Pt feels well today, no recent infections, fevers/chills. Evenity 105mg/1.17mL prefilled syringe x 2 administered subcutaneously into L thigh after sites cleansed with alcohol. Pt tolerated well, no leakage of medication from injection site, dressed with Band-Aid. \par \par Pt educated that site may be sore post-injection, she could experience mild malaise. Advised to call office if develops any pain, swelling, redness at site, new fevers or chills. \par \par Lot [id number], Exp 09/22 -- patients own medication

## 2020-08-12 LAB
25(OH)D3 SERPL-MCNC: 34.4 NG/ML
CALCIUM SERPL-MCNC: 10 MG/DL
PARATHYROID HORMONE INTACT: 42 PG/ML

## 2020-08-13 LAB — ALP BONE SERPL-MCNC: 8.9 MCG/L

## 2020-08-25 DIAGNOSIS — Z87.440 PERSONAL HISTORY OF URINARY (TRACT) INFECTIONS: ICD-10-CM

## 2020-08-25 DIAGNOSIS — N39.0 URINARY TRACT INFECTION, SITE NOT SPECIFIED: ICD-10-CM

## 2020-08-26 LAB
APPEARANCE: CLEAR
BILIRUBIN URINE: NEGATIVE
BLOOD URINE: NEGATIVE
COLOR: NORMAL
GLUCOSE QUALITATIVE U: NEGATIVE
KETONES URINE: NEGATIVE
LEUKOCYTE ESTERASE URINE: NEGATIVE
NITRITE URINE: NEGATIVE
PH URINE: 7.5
PROTEIN URINE: NEGATIVE
SPECIFIC GRAVITY URINE: 1.01
UROBILINOGEN URINE: NORMAL

## 2020-09-03 ENCOUNTER — RX RENEWAL (OUTPATIENT)
Age: 65
End: 2020-09-03

## 2020-09-17 ENCOUNTER — RESULT REVIEW (OUTPATIENT)
Age: 65
End: 2020-09-17

## 2020-10-06 ENCOUNTER — APPOINTMENT (OUTPATIENT)
Dept: RHEUMATOLOGY | Facility: CLINIC | Age: 65
End: 2020-10-06

## 2020-11-02 ENCOUNTER — RX RENEWAL (OUTPATIENT)
Age: 65
End: 2020-11-02

## 2020-12-23 PROBLEM — N39.0 ACUTE UTI: Status: RESOLVED | Noted: 2020-08-25 | Resolved: 2020-12-23

## 2020-12-23 PROBLEM — Z87.440 HISTORY OF URINARY TRACT INFECTION: Status: RESOLVED | Noted: 2020-08-25 | Resolved: 2020-12-23

## 2020-12-26 ENCOUNTER — RX RENEWAL (OUTPATIENT)
Age: 65
End: 2020-12-26

## 2020-12-26 RX ORDER — CLONAZEPAM 1 MG/1
1 TABLET ORAL
Qty: 30 | Refills: 0 | Status: ACTIVE | COMMUNITY
Start: 2020-09-03 | End: 1900-01-01

## 2021-01-28 ENCOUNTER — APPOINTMENT (OUTPATIENT)
Dept: RHEUMATOLOGY | Facility: CLINIC | Age: 66
End: 2021-01-28
Payer: MEDICARE

## 2021-01-28 ENCOUNTER — LABORATORY RESULT (OUTPATIENT)
Age: 66
End: 2021-01-28

## 2021-01-28 VITALS
HEART RATE: 95 BPM | OXYGEN SATURATION: 98 % | HEIGHT: 60 IN | DIASTOLIC BLOOD PRESSURE: 87 MMHG | SYSTOLIC BLOOD PRESSURE: 119 MMHG | WEIGHT: 171 LBS | TEMPERATURE: 97.9 F | BODY MASS INDEX: 33.57 KG/M2 | RESPIRATION RATE: 15 BRPM

## 2021-01-28 DIAGNOSIS — R30.0 DYSURIA: ICD-10-CM

## 2021-01-28 DIAGNOSIS — S22.000S WEDGE COMPRESSION FRACTURE OF UNSPECIFIED THORACIC VERTEBRA, SEQUELA: ICD-10-CM

## 2021-01-28 PROCEDURE — 99072 ADDL SUPL MATRL&STAF TM PHE: CPT

## 2021-01-28 PROCEDURE — 99214 OFFICE O/P EST MOD 30 MIN: CPT

## 2021-01-29 ENCOUNTER — RESULT REVIEW (OUTPATIENT)
Age: 66
End: 2021-01-29

## 2021-01-29 ENCOUNTER — APPOINTMENT (OUTPATIENT)
Dept: RADIOLOGY | Facility: HOSPITAL | Age: 66
End: 2021-01-29
Payer: MEDICARE

## 2021-01-29 ENCOUNTER — OUTPATIENT (OUTPATIENT)
Dept: OUTPATIENT SERVICES | Facility: HOSPITAL | Age: 66
LOS: 1 days | End: 2021-01-29
Payer: COMMERCIAL

## 2021-01-29 DIAGNOSIS — M16.11 UNILATERAL PRIMARY OSTEOARTHRITIS, RIGHT HIP: ICD-10-CM

## 2021-01-29 DIAGNOSIS — M54.5 LOW BACK PAIN: ICD-10-CM

## 2021-01-29 DIAGNOSIS — M79.661 PAIN IN RIGHT LOWER LEG: ICD-10-CM

## 2021-01-29 LAB
APPEARANCE: ABNORMAL
BILIRUBIN URINE: NEGATIVE
BLOOD URINE: NEGATIVE
COLOR: YELLOW
GLUCOSE QUALITATIVE U: NEGATIVE
KETONES URINE: NEGATIVE
LEUKOCYTE ESTERASE URINE: NEGATIVE
NITRITE URINE: NEGATIVE
PH URINE: 5.5
PROTEIN URINE: NEGATIVE
SPECIFIC GRAVITY URINE: 1.02
UROBILINOGEN URINE: NORMAL

## 2021-01-29 PROCEDURE — 73502 X-RAY EXAM HIP UNI 2-3 VIEWS: CPT | Mod: 26,RT

## 2021-01-29 PROCEDURE — 72100 X-RAY EXAM L-S SPINE 2/3 VWS: CPT | Mod: 26

## 2021-01-29 PROCEDURE — 73590 X-RAY EXAM OF LOWER LEG: CPT

## 2021-01-29 PROCEDURE — 73502 X-RAY EXAM HIP UNI 2-3 VIEWS: CPT

## 2021-01-29 PROCEDURE — 73590 X-RAY EXAM OF LOWER LEG: CPT | Mod: 26,RT

## 2021-01-29 PROCEDURE — 72100 X-RAY EXAM L-S SPINE 2/3 VWS: CPT

## 2021-02-03 ENCOUNTER — NON-APPOINTMENT (OUTPATIENT)
Age: 66
End: 2021-02-03

## 2021-02-16 NOTE — ASSESSMENT
[FreeTextEntry1] : INGRID BURRIS is a 65 year old woman with severe OP by MRI with multiple compression fractures and no metabolic etiology on extensive w/u with prior endocrinologist. DEXA with only osteopenia in setting of spinal DJD which is affecting the read thus unreliable as follow-up imaging for BMD assessment. Unable to take PO bisphosphonates 2/2 esophageal issues, fractured on Prolia,  thus Evenity chosen for bone building ability, has now completed recommended course of 12 months. Follow-up maintenance OP therapy has been on hold 2/2 dental sx ongoing. Today with acute onset R hip/groin pain with radicular sx, mild worsening of back pain, R shin pain. Unclear etiology -- new compression fx vs sciatica vs hip OA. Ongoing urinary sx as well. \par \par - check CR LS spine, hip, tib/fib \par - trial of muscle relaxer for possible sciatica, if not improved in 3-4 days, can start NSAIDs \par - if no fx on imaging, will start PT, referral given\par - check UA with reflex\par - c/w Ca/Vit D supplementation-- Ca 600mg BID with food, Vit D 1000 IU daily \par - advised to f/u and complete all dental sx as soon as feasible to limit time off OP therapies as potential for loss of Evenity efficacy without maintenance therapy \par - will call with imaging results

## 2021-02-16 NOTE — PHYSICAL EXAM
[General Appearance - Alert] : alert [General Appearance - In No Acute Distress] : in no acute distress [General Appearance - Well Nourished] : well nourished [Sclera] : the sclera and conjunctiva were normal [Oropharynx] : the oropharynx was normal [Neck Appearance] : the appearance of the neck was normal [Auscultation Breath Sounds / Voice Sounds] : lungs were clear to auscultation bilaterally [Heart Rate And Rhythm] : heart rate was normal and rhythm regular [Heart Sounds] : normal S1 and S2 [Edema] : there was no peripheral edema [Bowel Sounds] : normal bowel sounds [Abdomen Soft] : soft [Abdomen Tenderness] : non-tender [No CVA Tenderness] : no ~M costovertebral angle tenderness [] : no rash [No Focal Deficits] : no focal deficits [Oriented To Time, Place, And Person] : oriented to person, place, and time [Impaired Insight] : insight and judgment were intact [Affect] : the affect was normal [FreeTextEntry1] : Brouchards and Heberdens nodes over b/l hands with TTP, no synovitis. Antagic gait on R side, TTP over anterior shin but no induration

## 2021-02-16 NOTE — HISTORY OF PRESENT ILLNESS
[FreeTextEntry1] : INGRID BURRIS is a 65 year old woman who presents with advanced OP as seen on MRI with multiple atraumatic spinal compression fx with severe loss of height. Used Fosamax in the past but has esophageal motility issues which preclude it presently, trialled one dose of Prolia but had subsequent fracture, so transitioned to Evenity SQ with prior Endo, Dr Constantine Cabrera x 5 doses, then completed the full 12 months with me, after which MRI appears stable. \par \par + strong FH of severe OP with fragility fx, no tobacco, recurrent steroids for bronchitis, no inflammatory arthritis\par \par + OA with nodules over DIPs in b/l hands which are painful, inquiring about possible meds as cannot take PO NSAIDs 2/2 GI issues. \par \par ----------\par 1/28/21 -- 2 weeks of R sided buttock with radiation to the groin, occasional radicular sx, occasional groin numbness, occasional R foot numbness. No preceding trauma. Initial few steps from arising with limp, but walking improves thereafter. Some lower spine worsening pain as well. No falls. Ongoing suprapubic pressure/mild pain, no dysuria. + R anterior shin pain chronically. Ongoing dental sx for implants, OP meds on hold for this.

## 2021-02-16 NOTE — REASON FOR VISIT
[Follow-Up: _____] : a [unfilled] follow-up visit [FreeTextEntry1] : osteoporosis with multiple spinal compression fx, leg pain

## 2021-03-11 ENCOUNTER — APPOINTMENT (OUTPATIENT)
Dept: MRI IMAGING | Facility: CLINIC | Age: 66
End: 2021-03-11
Payer: MEDICARE

## 2021-03-11 ENCOUNTER — OUTPATIENT (OUTPATIENT)
Dept: OUTPATIENT SERVICES | Facility: HOSPITAL | Age: 66
LOS: 1 days | End: 2021-03-11
Payer: COMMERCIAL

## 2021-03-11 DIAGNOSIS — M54.9 DORSALGIA, UNSPECIFIED: ICD-10-CM

## 2021-03-11 PROCEDURE — 72148 MRI LUMBAR SPINE W/O DYE: CPT | Mod: 26

## 2021-03-11 PROCEDURE — 72146 MRI CHEST SPINE W/O DYE: CPT

## 2021-03-11 PROCEDURE — 72146 MRI CHEST SPINE W/O DYE: CPT | Mod: 26

## 2021-03-11 PROCEDURE — 72148 MRI LUMBAR SPINE W/O DYE: CPT

## 2021-03-16 ENCOUNTER — APPOINTMENT (OUTPATIENT)
Dept: RHEUMATOLOGY | Facility: CLINIC | Age: 66
End: 2021-03-16
Payer: MEDICARE

## 2021-03-16 ENCOUNTER — LABORATORY RESULT (OUTPATIENT)
Age: 66
End: 2021-03-16

## 2021-03-16 VITALS
HEIGHT: 60 IN | HEART RATE: 97 BPM | OXYGEN SATURATION: 99 % | RESPIRATION RATE: 15 BRPM | SYSTOLIC BLOOD PRESSURE: 160 MMHG | DIASTOLIC BLOOD PRESSURE: 80 MMHG | TEMPERATURE: 96.6 F | BODY MASS INDEX: 32.79 KG/M2 | WEIGHT: 167 LBS

## 2021-03-16 LAB
25(OH)D3 SERPL-MCNC: 35 NG/ML
ALBUMIN SERPL ELPH-MCNC: 4.5 G/DL
ALP BLD-CCNC: 119 U/L
ALT SERPL-CCNC: 9 U/L
ANION GAP SERPL CALC-SCNC: 12 MMOL/L
AST SERPL-CCNC: 15 U/L
BILIRUB SERPL-MCNC: 0.3 MG/DL
BUN SERPL-MCNC: 13 MG/DL
CALCIUM SERPL-MCNC: 9.8 MG/DL
CHLORIDE SERPL-SCNC: 101 MMOL/L
CO2 SERPL-SCNC: 26 MMOL/L
CREAT SERPL-MCNC: 0.73 MG/DL
GLUCOSE SERPL-MCNC: 96 MG/DL
POTASSIUM SERPL-SCNC: 4.4 MMOL/L
PROT SERPL-MCNC: 7.3 G/DL
SODIUM SERPL-SCNC: 139 MMOL/L

## 2021-03-16 PROCEDURE — 99214 OFFICE O/P EST MOD 30 MIN: CPT

## 2021-03-16 PROCEDURE — 99072 ADDL SUPL MATRL&STAF TM PHE: CPT

## 2021-03-16 NOTE — ASSESSMENT
[FreeTextEntry1] : INGRID BURRIS is a 65 year old woman with severe OP by MRI with multiple compression fractures and no metabolic etiology on extensive w/u with prior endocrinologist. DEXA with only osteopenia in setting of spinal DJD which is affecting the read thus unreliable as follow-up imaging for BMD assessment. Unable to take PO bisphosphonates 2/2 esophageal issues, fractured on Prolia, thus Evenity chosen for bone building ability, has now completed recommended course of 12 months. Follow-up maintenance OP therapy has been on hold 2/2 dental sx ongoing. Recent acute onset R hip/groin pain with radicular sx, mild worsening of back pain, which then worsened to include L side and limited ability to ambulate, new MRI with b/l sacral insufficiency fractures. \par \par -  reviewed  imaging in detail with patient, all questions answered \par - will put PT on hold at present until ortho can evaluate\par - urgent ortho evaluation, unclear if role for surgery however given extensive spinal issues but would appreciate their input \par - prn muscle relaxer\par - use walker at all times to ambulate \par - c/w Ca/Vit D supplementation-- Ca 600mg BID with food, Vit D 1000 IU daily \par - will need to put dental surgery on hold at present as BMD needs to be addresses immediately \par - check routine labs as below -- if in range will have back ASAP to administer prolia for BMD maintenance. Discussed possibility of adding Evista as concomitant therapy as well vs considering Forteo after she has been off Evenity for 1 year (fall 2021). \par - RTC for Prolia injection

## 2021-03-16 NOTE — PHYSICAL EXAM
[General Appearance - Alert] : alert [General Appearance - In No Acute Distress] : in no acute distress [General Appearance - Well Nourished] : well nourished [Sclera] : the sclera and conjunctiva were normal [Neck Appearance] : the appearance of the neck was normal [Auscultation Breath Sounds / Voice Sounds] : lungs were clear to auscultation bilaterally [Heart Rate And Rhythm] : heart rate was normal and rhythm regular [Heart Sounds] : normal S1 and S2 [Edema] : there was no peripheral edema [Bowel Sounds] : normal bowel sounds [Abdomen Soft] : soft [Abdomen Tenderness] : non-tender [No CVA Tenderness] : no ~M costovertebral angle tenderness [] : no rash [No Focal Deficits] : no focal deficits [Oriented To Time, Place, And Person] : oriented to person, place, and time [Impaired Insight] : insight and judgment were intact [Affect] : the affect was normal [FreeTextEntry1] : Brouchards and Heberdens nodes over b/l hands with TTP, no synovitis. Antalgic gait, ambulating with walker

## 2021-03-16 NOTE — REASON FOR VISIT
[Follow-Up: _____] : a [unfilled] follow-up visit [FreeTextEntry1] : osteoporosis with multiple spinal compression fx, sacral fx

## 2021-03-16 NOTE — HISTORY OF PRESENT ILLNESS
[FreeTextEntry1] : INGRID BURRIS is a 65 year old woman who presents with advanced OP as seen on MRI with multiple atraumatic spinal compression fx with severe loss of height. Used Fosamax in the past but has esophageal motility issues which preclude it presently, trialled one dose of Prolia but had subsequent fracture, so transitioned to Evenity SQ with prior Endo, Dr Constantine Cabrera x 5 doses, then completed the full 12 months with me, after which MRI appears stable. \par \par + strong FH of severe OP with fragility fx, no tobacco, recurrent steroids for bronchitis, no inflammatory arthritis\par \par + OA with nodules over DIPs in b/l hands which are painful, inquiring about possible meds as cannot take PO NSAIDs 2/2 GI issues. \par \par ----------\par 1/28/21 -- 2 weeks of R sided buttock with radiation to the groin, occasional radicular sx, occasional groin numbness, occasional R foot numbness. No preceding trauma. Initial few steps from arising with limp, but walking improves thereafter. Some lower spine worsening pain as well. No falls. Ongoing suprapubic pressure/mild pain, no dysuria. + R anterior shin pain chronically. Ongoing dental sx for implants, OP meds on hold for this. \par \par 3/16/21 -- Worsening hip, buttock pain on R which then moved to L as well with limited ROM in interim, MRI pelvis with b/l sacral insufficiency fx. Presently can only ambulate short distances with a walker, pain with lying flat, able to sit without severe pain. S/p bone graft but has not had implant placed yet. PT on hold given MRI results.

## 2021-03-17 LAB
BASOPHILS # BLD AUTO: 0.08 K/UL
BASOPHILS NFR BLD AUTO: 1.5 %
EOSINOPHIL # BLD AUTO: 0.08 K/UL
EOSINOPHIL NFR BLD AUTO: 1.5 %
HCT VFR BLD CALC: 39.3 %
HGB BLD-MCNC: 12.7 G/DL
IMM GRANULOCYTES NFR BLD AUTO: 0.2 %
LYMPHOCYTES # BLD AUTO: 1.48 K/UL
LYMPHOCYTES NFR BLD AUTO: 28.3 %
MAN DIFF?: NORMAL
MCHC RBC-ENTMCNC: 30.5 PG
MCHC RBC-ENTMCNC: 32.3 GM/DL
MCV RBC AUTO: 94.5 FL
MONOCYTES # BLD AUTO: 0.44 K/UL
MONOCYTES NFR BLD AUTO: 8.4 %
NEUTROPHILS # BLD AUTO: 3.14 K/UL
NEUTROPHILS NFR BLD AUTO: 60.1 %
PLATELET # BLD AUTO: 316 K/UL
RBC # BLD: 4.16 M/UL
RBC # FLD: 13.4 %
WBC # FLD AUTO: 5.23 K/UL

## 2021-03-18 ENCOUNTER — NON-APPOINTMENT (OUTPATIENT)
Age: 66
End: 2021-03-18

## 2021-03-18 ENCOUNTER — APPOINTMENT (OUTPATIENT)
Dept: ORTHOPEDIC SURGERY | Facility: CLINIC | Age: 66
End: 2021-03-18
Payer: MEDICARE

## 2021-03-18 DIAGNOSIS — M84.48XA PATHOLOGICAL FRACTURE, OTHER SITE, INITIAL ENCOUNTER FOR FRACTURE: ICD-10-CM

## 2021-03-18 LAB
ALBUMIN MFR SERPL ELPH: 60.4 %
ALBUMIN SERPL-MCNC: 4.4 G/DL
ALBUMIN/GLOB SERPL: 1.5 RATIO
ALPHA1 GLOB MFR SERPL ELPH: 3.6 %
ALPHA1 GLOB SERPL ELPH-MCNC: 0.3 G/DL
ALPHA2 GLOB MFR SERPL ELPH: 9.7 %
ALPHA2 GLOB SERPL ELPH-MCNC: 0.7 G/DL
B-GLOBULIN MFR SERPL ELPH: 12.3 %
B-GLOBULIN SERPL ELPH-MCNC: 0.9 G/DL
GAMMA GLOB FLD ELPH-MCNC: 1 G/DL
GAMMA GLOB MFR SERPL ELPH: 14 %
INTERPRETATION SERPL IEP-IMP: NORMAL
PROT SERPL-MCNC: 7.3 G/DL
PROT SERPL-MCNC: 7.3 G/DL

## 2021-03-18 PROCEDURE — 99213 OFFICE O/P EST LOW 20 MIN: CPT

## 2021-03-18 PROCEDURE — 99072 ADDL SUPL MATRL&STAF TM PHE: CPT

## 2021-03-18 NOTE — DISCUSSION/SUMMARY
[de-identified] : The underlying pathophysiology was reviewed with the patient. XR films were reviewed with the patient. Discussed at length the nature of the patient’s condition. Their pelvis symptoms appear secondary to pelvic fracture. \par \par Treatment options were discussed including; observation, NSAIDS, analgesics, and PT.\par \par Surgical intervention not indicated at this time, will try alternative treatment. \par Recommended taking Calcium Citrate, Vitamin D3, and Vitamin C.\par Referred to Dr. Michael English to discuss possible spinal injection, request made. \par Rx given for lumbar back brace.\par Rx given for Naproxen 500mg BID.\par Rx given for rollator walker.\par Rx given for 4-prong cane.\par The patient wishes to proceed with physical therapy. A script was given for lumbar spine and gait training. \par \par Patient can continue activities as tolerated. All questions answered, understanding verbalized. Patient in agreement with plan of care.\par \par Follow up in 3 months.

## 2021-03-18 NOTE — PHYSICAL EXAM
[de-identified] : Patient is WDWN, alert, and in no acute distress. Breathing is unlabored. She is grossly oriented to person, place, \par and time. \par \par Patient ambulates with a rolling walker.\par \par Diffuse tenderness along posterior pelvis and sacrum [de-identified] : EXAM: MR SPINE LUMBAR and MR SPINE THORACIC - 03/11/2021\par \par IMPRESSION:\par 1. Acute to subacute appearing bilateral sacral insufficiency fractures with involvement of the S1-S2 endplates.\par 2. Numerous chronic thoracic and lumbar compression deformities are present. This included on the recent x-rays appear similar. Deformities include mild T6, moderate T9, moderate to severe T10, moderate to severe T11, severe L1, and moderate L3 deformities.\par 3. Multilevel degenerative disc disease is present.\par 4. C6-C7 demonstrates mild central canal stenosis.\par 5. T10-T11 demonstrate mild to moderate central canal stenosis.\par 6. L2-L3 demonstrates mild-to-moderate central canal stenosis.\par 7. L1-L2 demonstrates mild central canal stenosis and mild to moderate narrowing of the left lateral recess.\par 8. There is variable neural foraminal stenosis.\par \par ROSA M STEVENSON MD; Attending Radiologist\par This document has been electronically signed. Mar 15 2021 9:21AM\par \par \par 8/2020  - AP and lateral views of the bilateral hip and pelvis were obtained and revealed  moderate arthritis of the right hip. 	\par \par \par 1/29/21 \par IMPRESSION: Significant degenerative changes noted in the right hip without significant change from prior examination dated 8/11/2020.\par \par JEN RODRIGUEZ MD; Attending Radiologist\par This document has been electronically signed. Jan 29 2021 1:06PM\par

## 2021-03-18 NOTE — HISTORY OF PRESENT ILLNESS
[de-identified] : INGRID BURRIS is a 65 year female who presents for initial evaluation of a pelvic fracture.  She states that she started having pain approximately 6 weeks ago.  She felt a lot of pressure in her pelvis and she had it evaluated medically but no issues were found.  She sees a Rheumatologist who thought she had sciatica.  She suffers from severe Osteoporosis.  The pain continued and she had an MRI which revealed sacral insufficiency fractures.  She is unable to walk without pain. She walks with a limp.  The pain is primarily on her right side but she has developed some left sided pain recently.

## 2021-03-18 NOTE — END OF VISIT
[FreeTextEntry3] : All medical record entries made by the Scribe were at my,  Dr. Lance Torres MD., direction and personally dictated by me on 03/18/2021. I have personally reviewed the chart and agree that the record accurately reflects my personal performance of the history, physical exam, assessment and plan.\par \par

## 2021-03-18 NOTE — ADDENDUM
[FreeTextEntry1] : I, Romina Thacker wrote this note acting as a scribe for Dr. Lance Torres on Mar 18, 2021.\par \par

## 2021-03-19 ENCOUNTER — RX RENEWAL (OUTPATIENT)
Age: 66
End: 2021-03-19

## 2021-03-19 LAB
ALBUPE: 12.3 %
ALPHA1UPE: 42.9 %
ALPHA2UPE: 19.9 %
BETAUPE: 16 %
GAMMAUPE: 8.9 %
IGA 24H UR QL IFE: NORMAL
KAPPA LC 24H UR QL: NORMAL
PROT PATTERN 24H UR ELPH-IMP: NORMAL
PROT UR-MCNC: 10 MG/DL
PROT UR-MCNC: 10 MG/DL

## 2021-05-06 ENCOUNTER — APPOINTMENT (OUTPATIENT)
Dept: RHEUMATOLOGY | Facility: CLINIC | Age: 66
End: 2021-05-06
Payer: MEDICARE

## 2021-05-06 VITALS
OXYGEN SATURATION: 96 % | WEIGHT: 174 LBS | DIASTOLIC BLOOD PRESSURE: 92 MMHG | RESPIRATION RATE: 15 BRPM | TEMPERATURE: 97.2 F | HEART RATE: 93 BPM | BODY MASS INDEX: 34.16 KG/M2 | SYSTOLIC BLOOD PRESSURE: 148 MMHG | HEIGHT: 60 IN

## 2021-05-06 PROCEDURE — 99072 ADDL SUPL MATRL&STAF TM PHE: CPT

## 2021-05-06 PROCEDURE — 96401 CHEMO ANTI-NEOPL SQ/IM: CPT

## 2021-05-06 RX ORDER — DENOSUMAB 60 MG/ML
60 INJECTION SUBCUTANEOUS
Qty: 1 | Refills: 0 | Status: COMPLETED | OUTPATIENT
Start: 2021-05-06

## 2021-05-06 RX ADMIN — DENOSUMAB 0 MG/ML: 60 INJECTION SUBCUTANEOUS at 00:00

## 2021-05-06 NOTE — PROCEDURE
[Today's Date:] : Date: [unfilled] [FreeTextEntry1] : Pt feels well today, no recent infections, fevers/chills. Prolia 60mg administered subcutaneously into L thigh after site cleansed with alcohol. Pt tolerated well, no leakage of medication from injection site, dressed with Band-Aid. \par \par Pt educated that site may be sore post-injection, she could experience mild malaise. Advised to call office if develops any pain, swelling, redness at site, new fevers or chills.

## 2021-05-06 NOTE — ASSESSMENT
[FreeTextEntry1] : INGRID BURRIS is a 65 year old woman with severe OP by MRI with multiple compression fractures and no metabolic etiology on extensive w/u with prior endocrinologist. DEXA with only osteopenia in setting of spinal DJD which is affecting the read thus unreliable as follow-up imaging for BMD assessment. Unable to take PO bisphosphonates 2/2 esophageal issues, fractured on Prolia, thus Evenity chosen for bone building ability, has now completed recommended course of 12 months. Follow-up maintenance OP therapy has been on hold 2/2 dental sx ongoing. Recent acute onset R hip/groin pain with radicular sx, mild worsening of back pain, which then worsened to include L side and limited ability to ambulate, new MRI with b/l sacral insufficiency fractures. Has been only improving slowly with PT, still very functionally limited, interested in aqua therapy. S/p prolia today. \par \par - post procedure instructions provided \par - PT referral for aqua therapy\par - use walker at all times to ambulate \par - c/w Ca/Vit D supplementation-- Ca 600mg BID with food, Vit D 1000 IU daily \par - dental surgery on hold at present as BMD needs to be addresses immediately -- will reasess timeline for this once pt has had at least 2 doses of prolia, pt verbalized understanding \par - labs stable, will repeat prior to next dose\par - RTC for Prolia in 6 months\par

## 2021-05-25 ENCOUNTER — APPOINTMENT (OUTPATIENT)
Dept: RHEUMATOLOGY | Facility: CLINIC | Age: 66
End: 2021-05-25

## 2021-06-09 NOTE — ASSESSMENT
[FreeTextEntry1] : INGRID BURRIS is a 64 year old woman who presents with severe OP by MRI with multiple compression fractures and no metabolic etiology on extensive w/u with prior endocrinologist. Transferring care as they no longer take her insurance. Unable to take PO bisphosphonates 2/2 esophageal issues, fractured on Prolia, has been on Evenity x 5 doses so far. + OA in fingers and unable to take PO NSAIDs. \par \par - 6th dose administered today, tolerated well\par - c/w Ca/Vit D supplementation-- Ca 600mg BID with food, Vit D 1000 IU daily \par - has lab slip from prior endo to be done following 6th dose, will have done in 2 weeks, and have results faxed for my review\par - release for chart from prior endo signed today, will obtain/review chart\par - trial of voltaren gel for OA \par - RTC in 1 month for next set of injections, pt to bring own stock from her pharmacy  Mercedes Flap Text: The defect edges were debeveled with a #15 scalpel blade.  Given the location of the defect, shape of the defect and the proximity to free margins a Mercedes flap was deemed most appropriate.  Using a sterile surgical marker, an appropriate advancement flap was drawn incorporating the defect and placing the expected incisions within the relaxed skin tension lines where possible. The area thus outlined was incised deep to adipose tissue with a #15 scalpel blade.  The skin margins were undermined to an appropriate distance in all directions utilizing iris scissors.

## 2021-10-05 ENCOUNTER — RX RENEWAL (OUTPATIENT)
Age: 66
End: 2021-10-05

## 2021-11-09 ENCOUNTER — APPOINTMENT (OUTPATIENT)
Dept: RHEUMATOLOGY | Facility: CLINIC | Age: 66
End: 2021-11-09
Payer: MEDICARE

## 2021-11-09 VITALS
HEIGHT: 60 IN | HEART RATE: 87 BPM | WEIGHT: 170 LBS | BODY MASS INDEX: 33.38 KG/M2 | RESPIRATION RATE: 15 BRPM | TEMPERATURE: 96.2 F | OXYGEN SATURATION: 97 % | DIASTOLIC BLOOD PRESSURE: 80 MMHG | SYSTOLIC BLOOD PRESSURE: 130 MMHG

## 2021-11-09 DIAGNOSIS — M54.50 LOW BACK PAIN, UNSPECIFIED: ICD-10-CM

## 2021-11-09 DIAGNOSIS — S32.10XS UNSPECIFIED FRACTURE OF SACRUM, SEQUELA: ICD-10-CM

## 2021-11-09 DIAGNOSIS — M79.604 LOW BACK PAIN, UNSPECIFIED: ICD-10-CM

## 2021-11-09 PROCEDURE — 96401 CHEMO ANTI-NEOPL SQ/IM: CPT

## 2021-11-09 PROCEDURE — 96372 THER/PROPH/DIAG INJ SC/IM: CPT

## 2021-11-09 PROCEDURE — 99214 OFFICE O/P EST MOD 30 MIN: CPT | Mod: 25

## 2021-11-09 RX ORDER — DENOSUMAB 60 MG/ML
60 INJECTION SUBCUTANEOUS
Qty: 1 | Refills: 0 | Status: COMPLETED | OUTPATIENT
Start: 2021-11-09

## 2021-11-09 RX ORDER — NAPROXEN 500 MG/1
500 TABLET ORAL
Qty: 60 | Refills: 2 | Status: ACTIVE | COMMUNITY
Start: 2021-03-18 | End: 1900-01-01

## 2021-11-09 RX ADMIN — DENOSUMAB 0 MG/ML: 60 INJECTION SUBCUTANEOUS at 00:00

## 2021-11-09 NOTE — HISTORY OF PRESENT ILLNESS
[FreeTextEntry1] : INGRID BURRIS is a 66 year old woman who presents with advanced OP as seen on MRI with multiple atraumatic spinal compression fx with severe loss of height. Used Fosamax in the past but has esophageal motility issues which preclude it presently, trialled one dose of Prolia but had subsequent fracture, so transitioned to Evenity SQ with prior Endo, Dr Constantine Cabrera x 5 doses, then completed the full 12 months with me, after which MRI appears stable. \par \par + strong FH of severe OP with fragility fx, no tobacco, recurrent steroids for bronchitis, no inflammatory arthritis\par \par + OA with nodules over DIPs in b/l hands which are painful, inquiring about possible meds as cannot take PO NSAIDs 2/2 GI issues. \par \par ----------\par 1/28/21 -- 2 weeks of R sided buttock with radiation to the groin, occasional radicular sx, occasional groin numbness, occasional R foot numbness. No preceding trauma. Initial few steps from arising with limp, but walking improves thereafter. Some lower spine worsening pain as well. No falls. Ongoing suprapubic pressure/mild pain, no dysuria. + R anterior shin pain chronically. Ongoing dental sx for implants, OP meds on hold for this. \par \par 3/16/21 -- Worsening hip, buttock pain on R which then moved to L as well with limited ROM in interim, MRI pelvis with b/l sacral insufficiency fx. Presently can only ambulate short distances with a walker, pain with lying flat, able to sit without severe pain. S/p bone graft but has not had implant placed yet. PT on hold given MRI results. \par \par 11/9/21 -- Has been tolerating Prolia well, due for next dose today. No new fractures since last visit. Worsening R hip pain in setting of known advanced OA, planning for possible THR, pain not too severe with ambulation but very severe at night, asking about possible medication mgmt until sx is completed. Surgeon is trying to ID if its back, sacral and/or hip pain to better determine which sx is indicated. Dental implants also on hold 2/2 Prolia, asking about timeline. Has stopped taking supplemental Ca/Vit D as feels its too constipating, trying to get it in her diet.

## 2021-11-09 NOTE — REVIEW OF SYSTEMS
[Arthralgias] : arthralgias [Joint Pain] : joint pain [As Noted in HPI] : as noted in HPI [Constipation] : constipation [Joint Swelling] : no joint swelling [Joint Stiffness] : no joint stiffness [Negative] : Neurological

## 2021-11-09 NOTE — PHYSICAL EXAM
[General Appearance - Alert] : alert [General Appearance - In No Acute Distress] : in no acute distress [General Appearance - Well Nourished] : well nourished [Sclera] : the sclera and conjunctiva were normal [Neck Appearance] : the appearance of the neck was normal [Auscultation Breath Sounds / Voice Sounds] : lungs were clear to auscultation bilaterally [Heart Rate And Rhythm] : heart rate was normal and rhythm regular [Heart Sounds] : normal S1 and S2 [Edema] : there was no peripheral edema [Bowel Sounds] : normal bowel sounds [Abdomen Soft] : soft [Abdomen Tenderness] : non-tender [No CVA Tenderness] : no ~M costovertebral angle tenderness [] : no rash [No Focal Deficits] : no focal deficits [Oriented To Time, Place, And Person] : oriented to person, place, and time [Impaired Insight] : insight and judgment were intact [Affect] : the affect was normal [FreeTextEntry1] : Brouchards and Heberdens nodes over b/l hands with TTP, no synovitis. Antalgic gait, ambulating with walker, R hip ROM severely limited 2/2 pain

## 2021-11-09 NOTE — ASSESSMENT
[FreeTextEntry1] : INGRID BURRIS is a 66 year old woman with severe OP by MRI with multiple compression fractures and no metabolic etiology on extensive w/u with prior endocrinologist. DEXA with only osteopenia in setting of spinal DJD which is affecting the read thus unreliable as follow-up imaging for BMD assessment. Unable to take PO bisphosphonates 2/2 esophageal issues, fractured on Prolia, thus Evenity chosen for bone building ability, has now completed recommended course of 12 months. Follow-up maintenance OP therapy had been on hold 2/2 dental sx ongoing. Recent acute onset R hip/groin pain with radicular sx, mild worsening of back pain, which then worsened to include L side and limited ability to ambulate, new MRI with b/l sacral insufficiency fractures and started on Prolia now with no further fractures. S/p Prolia today. Worsening R  hip pain, known OA, planning for sx once surgeon can determine where the majority of her pain is coming from, severe pain at night time. \par \par - resume QHS prn muscle relaxer as this has helped in the past and not overly sedating\par - naproxen 500mg BID with food prn as well, she notes some constipation but has linzess which helps \par - can also try OTC lidocaine 4% patches, wear for 12 hours, remove for 12 hours \par - PT referral for aqua therapy \par - use walker at all times to ambulate \par - c/w Ca/Vit D supplementation-- she can get dietary Ca to equal 600mg BID, but strongly encourage supplementation of Vit D with goal of 4644-5568 IU daily as hard to get this thru diet alone \par - check labs as below to check Ca/Vit D levels \par - post procedure instructions provided \par - will need to put dental surgery on hold at present as BMD needs to be addresses immediately -- discussed that I would like the Prolia on board until at least 3 months s/p THR if this is deemed necessary to ensure it heals well. Thereafter we can hold prolia for a 6-9 month interval to have her dental work completed but then would recommend prompt reinitiation. She verbalized understanding. \par - RTC for Prolia injection in 6 months, sooner if worsening arthralgias

## 2021-11-09 NOTE — REASON FOR VISIT
[Procedure: _________] : a [unfilled] procedure visit [Follow-Up: _____] : a [unfilled] follow-up visit [FreeTextEntry1] : osteoporosis with multiple spinal compression fx, sacral fx

## 2021-11-09 NOTE — ASSESSMENT
[FreeTextEntry1] : INGRID BURRIS is a 66 year old woman with severe OP by MRI with multiple compression fractures and no metabolic etiology on extensive w/u with prior endocrinologist. DEXA with only osteopenia in setting of spinal DJD which is affecting the read thus unreliable as follow-up imaging for BMD assessment. Unable to take PO bisphosphonates 2/2 esophageal issues, fractured on Prolia, thus Evenity chosen for bone building ability, has now completed recommended course of 12 months. Follow-up maintenance OP therapy had been on hold 2/2 dental sx ongoing. Recent acute onset R hip/groin pain with radicular sx, mild worsening of back pain, which then worsened to include L side and limited ability to ambulate, new MRI with b/l sacral insufficiency fractures and started on Prolia now with no further fractures. S/p Prolia today. Worsening R  hip pain, known OA, planning for sx once surgeon can determine where the majority of her pain is coming from, severe pain at night time. \par \par - resume QHS prn muscle relaxer as this has helped in the past and not overly sedating\par - naproxen 500mg BID with food prn as well, she notes some constipation but has linzess which helps \par - can also try OTC lidocaine 4% patches, wear for 12 hours, remove for 12 hours \par - PT referral for aqua therapy \par - use walker at all times to ambulate \par - c/w Ca/Vit D supplementation-- she can get dietary Ca to equal 600mg BID, but strongly encourage supplementation of Vit D with goal of 1596-3216 IU daily as hard to get this thru diet alone \par - check labs as below to check Ca/Vit D levels \par - post procedure instructions provided \par - will need to put dental surgery on hold at present as BMD needs to be addresses immediately -- discussed that I would like the Prolia on board until at least 3 months s/p THR if this is deemed necessary to ensure it heals well. Thereafter we can hold prolia for a 6-9 month interval to have her dental work completed but then would recommend prompt reinitiation. She verbalized understanding. \par - RTC for Prolia injection in 6 months, sooner if worsening arthralgias

## 2022-03-04 LAB
25(OH)D3 SERPL-MCNC: 30.2 NG/ML
ALBUMIN SERPL ELPH-MCNC: 4.5 G/DL
ALP BLD-CCNC: 52 U/L
ALT SERPL-CCNC: 11 U/L
ANION GAP SERPL CALC-SCNC: 14 MMOL/L
AST SERPL-CCNC: 16 U/L
BASOPHILS # BLD AUTO: 0.06 K/UL
BASOPHILS NFR BLD AUTO: 0.9 %
BILIRUB SERPL-MCNC: 0.2 MG/DL
BUN SERPL-MCNC: 17 MG/DL
CALCIUM SERPL-MCNC: 9.7 MG/DL
CHLORIDE SERPL-SCNC: 103 MMOL/L
CO2 SERPL-SCNC: 24 MMOL/L
CREAT SERPL-MCNC: 1.4 MG/DL
EGFR: 41 ML/MIN/1.73M2
EOSINOPHIL # BLD AUTO: 0.08 K/UL
EOSINOPHIL NFR BLD AUTO: 1.2 %
GLUCOSE SERPL-MCNC: 94 MG/DL
HCT VFR BLD CALC: 39.3 %
HGB BLD-MCNC: 13 G/DL
IMM GRANULOCYTES NFR BLD AUTO: 0.2 %
LYMPHOCYTES # BLD AUTO: 1.83 K/UL
LYMPHOCYTES NFR BLD AUTO: 27.7 %
MAN DIFF?: NORMAL
MCHC RBC-ENTMCNC: 32.2 PG
MCHC RBC-ENTMCNC: 33.1 GM/DL
MCV RBC AUTO: 97.3 FL
MONOCYTES # BLD AUTO: 0.47 K/UL
MONOCYTES NFR BLD AUTO: 7.1 %
NEUTROPHILS # BLD AUTO: 4.16 K/UL
NEUTROPHILS NFR BLD AUTO: 62.9 %
PLATELET # BLD AUTO: 236 K/UL
POTASSIUM SERPL-SCNC: 4.5 MMOL/L
PROT SERPL-MCNC: 7 G/DL
RBC # BLD: 4.04 M/UL
RBC # FLD: 13.1 %
SODIUM SERPL-SCNC: 141 MMOL/L
WBC # FLD AUTO: 6.61 K/UL

## 2022-05-12 ENCOUNTER — OUTPATIENT (OUTPATIENT)
Dept: OUTPATIENT SERVICES | Facility: HOSPITAL | Age: 67
LOS: 1 days | End: 2022-05-12
Payer: COMMERCIAL

## 2022-05-12 ENCOUNTER — RESULT REVIEW (OUTPATIENT)
Age: 67
End: 2022-05-12

## 2022-05-12 ENCOUNTER — APPOINTMENT (OUTPATIENT)
Dept: RADIOLOGY | Facility: HOSPITAL | Age: 67
End: 2022-05-12
Payer: MEDICARE

## 2022-05-12 ENCOUNTER — APPOINTMENT (OUTPATIENT)
Dept: RHEUMATOLOGY | Facility: CLINIC | Age: 67
End: 2022-05-12
Payer: MEDICARE

## 2022-05-12 VITALS
HEART RATE: 97 BPM | DIASTOLIC BLOOD PRESSURE: 84 MMHG | RESPIRATION RATE: 18 BRPM | BODY MASS INDEX: 32.39 KG/M2 | OXYGEN SATURATION: 97 % | TEMPERATURE: 97.1 F | SYSTOLIC BLOOD PRESSURE: 132 MMHG | HEIGHT: 60 IN | WEIGHT: 165 LBS

## 2022-05-12 DIAGNOSIS — G89.29 PAIN IN RIGHT KNEE: ICD-10-CM

## 2022-05-12 DIAGNOSIS — M79.661 PAIN IN RIGHT LOWER LEG: ICD-10-CM

## 2022-05-12 DIAGNOSIS — M89.8X5 OTHER SPECIFIED DISORDERS OF BONE, THIGH: ICD-10-CM

## 2022-05-12 DIAGNOSIS — M25.561 PAIN IN RIGHT KNEE: ICD-10-CM

## 2022-05-12 PROCEDURE — 73562 X-RAY EXAM OF KNEE 3: CPT | Mod: 26,RT

## 2022-05-12 PROCEDURE — 73552 X-RAY EXAM OF FEMUR 2/>: CPT | Mod: 26,RT

## 2022-05-12 PROCEDURE — 73562 X-RAY EXAM OF KNEE 3: CPT

## 2022-05-12 PROCEDURE — 73590 X-RAY EXAM OF LOWER LEG: CPT | Mod: 26,RT

## 2022-05-12 PROCEDURE — 99214 OFFICE O/P EST MOD 30 MIN: CPT | Mod: 25

## 2022-05-12 PROCEDURE — 73552 X-RAY EXAM OF FEMUR 2/>: CPT

## 2022-05-12 PROCEDURE — 96401 CHEMO ANTI-NEOPL SQ/IM: CPT

## 2022-05-12 PROCEDURE — 73590 X-RAY EXAM OF LOWER LEG: CPT

## 2022-05-12 RX ORDER — DENOSUMAB 60 MG/ML
60 INJECTION SUBCUTANEOUS
Qty: 1 | Refills: 0 | Status: COMPLETED | OUTPATIENT
Start: 2022-05-12

## 2022-05-12 RX ADMIN — DENOSUMAB 0 MG/ML: 60 INJECTION SUBCUTANEOUS at 00:00

## 2022-06-15 ENCOUNTER — APPOINTMENT (OUTPATIENT)
Dept: FAMILY MEDICINE | Facility: CLINIC | Age: 67
End: 2022-06-15

## 2022-06-21 NOTE — ASSESSMENT
[FreeTextEntry1] : INGRID BURRIS is a 66 year old woman with severe OP by MRI with multiple compression fractures and no metabolic etiology on extensive w/u with prior endocrinologist. DEXA with only osteopenia in setting of spinal DJD which is affecting the read thus unreliable as follow-up imaging for BMD assessment. Unable to take PO bisphosphonates 2/2 esophageal issues, fractured on short trial of Prolia, thus Evenity chosen for bone building ability, has now completed recommended course of 12 months. Follow-up maintenance OP therapy had been on hold 2/2 dental sx ongoing. Recent acute onset R hip/groin pain with radicular sx, mild worsening of back pain, which then worsened to include L side and limited ability to ambulate, new MRI with b/l sacral insufficiency fractures and started on Prolia now with no further fractures. \par \par # OP with issues as above\par - s/p prolia today - post procedure instructions provided \par - extensive discussion had that hers is a severe/ unusual case hence her management trajectory. We will repeat a DEXA as she may need it for surgical clearance but discussed it might look falsely better than her actual BMD given prior hx and a good reading would not preclude continuation of a BMD maintenance therapy\par - discussed with her that given she had completed 1 year of evenity, no present role for using BMD building therapies per current guidelines but if felt that present therapies are not adequate would recommend Endo input for further management \par - unclear role to repeat MRI at present as would not change OP mgmt \par - c/w Ca/Vit D supplementation-- she can get dietary Ca to equal 600mg BID, but strongly encourage supplementation of Vit D with goal of 8931-2568 IU daily \par - recent labs stable, repeat prior to next visit\par - dental surgery on hold at present as BMD needs especially given potential upcoming sx are more important -- discussed that I would like the Prolia on board until at least 3 months s/p THR to ensure it heals well. Thereafter we can hold prolia for a 6-9 month interval to have her dental work completed but then would recommend prompt reinitiation. She verbalized understanding. \par - she is concerned her RLE pain may be fx related -- will image given TTP but chronic hx and I am more suspicious its radiating from hip \par \par # Worsening R  hip pain, known OA, planning for sx \par - c/w QHS prn muscle relaxer as this has helped in the past and not overly sedating\par - naproxen 500mg BID with food prn but advised she will have to stop this perisx \par - can try OTC lidocaine 4% patches, wear for 12 hours, remove for 12 hours \par - PT per ortho \par - use walker at all times to ambulate \par - no rheum contraindication to planned procedure \par \par RTC for Prolia injection in 6 months, sooner if worsening arthralgias

## 2022-06-21 NOTE — PHYSICAL EXAM
[General Appearance - Alert] : alert [General Appearance - In No Acute Distress] : in no acute distress [General Appearance - Well Nourished] : well nourished [Sclera] : the sclera and conjunctiva were normal [Neck Appearance] : the appearance of the neck was normal [Auscultation Breath Sounds / Voice Sounds] : lungs were clear to auscultation bilaterally [Heart Rate And Rhythm] : heart rate was normal and rhythm regular [Heart Sounds] : normal S1 and S2 [Edema] : there was no peripheral edema [Bowel Sounds] : normal bowel sounds [Abdomen Soft] : soft [Abdomen Tenderness] : non-tender [No CVA Tenderness] : no ~M costovertebral angle tenderness [] : no rash [No Focal Deficits] : no focal deficits [Oriented To Time, Place, And Person] : oriented to person, place, and time [Impaired Insight] : insight and judgment were intact [Affect] : the affect was normal [FreeTextEntry1] : Brouchards and Heberdens nodes over b/l hands with TTP, no synovitis. Antalgic gait, ambulating with walker, R hip ROM severely limited 2/2 pain, no focal abnormality over affected sites but + TTP

## 2022-06-21 NOTE — HISTORY OF PRESENT ILLNESS
[FreeTextEntry1] : INGRID BURRIS is a 66 year old woman who presents with advanced OP as seen on MRI with multiple atraumatic spinal compression fx with severe loss of height. Used Fosamax in the past but has esophageal motility issues which preclude it presently, trialled one dose of Prolia but had subsequent fracture, so transitioned to Evenity SQ with prior Endo, Dr Constantine Cabrera x 5 doses, then completed the full 12 months with me, after which MRI appears stable. \par \par + strong FH of severe OP with fragility fx, no tobacco, recurrent steroids for bronchitis, no inflammatory arthritis\par \par + OA with nodules over DIPs in b/l hands which are painful, inquiring about possible meds as cannot take PO NSAIDs 2/2 GI issues. \par \par ----------\par 1/28/21 -- 2 weeks of R sided buttock with radiation to the groin, occasional radicular sx, occasional groin numbness, occasional R foot numbness. No preceding trauma. Initial few steps from arising with limp, but walking improves thereafter. Some lower spine worsening pain as well. No falls. Ongoing suprapubic pressure/mild pain, no dysuria. + R anterior shin pain chronically. Ongoing dental sx for implants, OP meds on hold for this. \par \par 3/16/21 -- Worsening hip, buttock pain on R which then moved to L as well with limited ROM in interim, MRI pelvis with b/l sacral insufficiency fx. Presently can only ambulate short distances with a walker, pain with lying flat, able to sit without severe pain. S/p bone graft but has not had implant placed yet. PT on hold given MRI results. \par \par 11/9/21 -- Has been tolerating Prolia well, due for next dose today. No new fractures since last visit. Worsening R hip pain in setting of known advanced OA, planning for possible THR, pain not too severe with ambulation but very severe at night, asking about possible medication mgmt until sx is completed. Surgeon is trying to ID if its back, sacral and/or hip pain to better determine which sx is indicated. Dental implants also on hold 2/2 Prolia, asking about timeline. Has stopped taking supplemental Ca/Vit D as feels its too constipating, trying to get it in her diet. \par \par 5/12/22 -- Here for Prolia injection but very worried it might not be effective and wanting to discuss how to best assess and other options if needed. No trips/falls since last visit, ongoing R hip pain and prepping for surgery now, pain radiating down to femur, knee and shin and she is very worried she has a fracture along these areas. DEXA results were raised at surgery visit which has also worried her.

## 2022-06-21 NOTE — REVIEW OF SYSTEMS
[Constipation] : constipation [As Noted in HPI] : as noted in HPI [Arthralgias] : arthralgias [Joint Pain] : joint pain [Negative] : Heme/Lymph [Joint Swelling] : no joint swelling [Joint Stiffness] : no joint stiffness

## 2022-06-21 NOTE — ASSESSMENT
[FreeTextEntry1] : INGRID BURRIS is a 66 year old woman with severe OP by MRI with multiple compression fractures and no metabolic etiology on extensive w/u with prior endocrinologist. DEXA with only osteopenia in setting of spinal DJD which is affecting the read thus unreliable as follow-up imaging for BMD assessment. Unable to take PO bisphosphonates 2/2 esophageal issues, fractured on short trial of Prolia, thus Evenity chosen for bone building ability, has now completed recommended course of 12 months. Follow-up maintenance OP therapy had been on hold 2/2 dental sx ongoing. Recent acute onset R hip/groin pain with radicular sx, mild worsening of back pain, which then worsened to include L side and limited ability to ambulate, new MRI with b/l sacral insufficiency fractures and started on Prolia now with no further fractures. \par \par # OP with issues as above\par - s/p prolia today - post procedure instructions provided \par - extensive discussion had that hers is a severe/ unusual case hence her management trajectory. We will repeat a DEXA as she may need it for surgical clearance but discussed it might look falsely better than her actual BMD given prior hx and a good reading would not preclude continuation of a BMD maintenance therapy\par - discussed with her that given she had completed 1 year of evenity, no present role for using BMD building therapies per current guidelines but if felt that present therapies are not adequate would recommend Endo input for further management \par - unclear role to repeat MRI at present as would not change OP mgmt \par - c/w Ca/Vit D supplementation-- she can get dietary Ca to equal 600mg BID, but strongly encourage supplementation of Vit D with goal of 0040-0453 IU daily \par - recent labs stable, repeat prior to next visit\par - dental surgery on hold at present as BMD needs especially given potential upcoming sx are more important -- discussed that I would like the Prolia on board until at least 3 months s/p THR to ensure it heals well. Thereafter we can hold prolia for a 6-9 month interval to have her dental work completed but then would recommend prompt reinitiation. She verbalized understanding. \par - she is concerned her RLE pain may be fx related -- will image given TTP but chronic hx and I am more suspicious its radiating from hip \par \par # Worsening R  hip pain, known OA, planning for sx \par - c/w QHS prn muscle relaxer as this has helped in the past and not overly sedating\par - naproxen 500mg BID with food prn but advised she will have to stop this perisx \par - can try OTC lidocaine 4% patches, wear for 12 hours, remove for 12 hours \par - PT per ortho \par - use walker at all times to ambulate \par - no rheum contraindication to planned procedure \par \par RTC for Prolia injection in 6 months, sooner if worsening arthralgias

## 2022-11-29 ENCOUNTER — APPOINTMENT (OUTPATIENT)
Dept: RHEUMATOLOGY | Facility: CLINIC | Age: 67
End: 2022-11-29

## 2023-04-23 ENCOUNTER — NON-APPOINTMENT (OUTPATIENT)
Age: 68
End: 2023-04-23

## 2023-05-03 ENCOUNTER — APPOINTMENT (OUTPATIENT)
Dept: DERMATOLOGY | Facility: CLINIC | Age: 68
End: 2023-05-03
Payer: MEDICARE

## 2023-05-03 PROCEDURE — 99204 OFFICE O/P NEW MOD 45 MIN: CPT

## 2023-05-24 ENCOUNTER — APPOINTMENT (OUTPATIENT)
Dept: FAMILY MEDICINE | Facility: CLINIC | Age: 68
End: 2023-05-24
Payer: MEDICARE

## 2023-05-24 VITALS
RESPIRATION RATE: 17 BRPM | HEART RATE: 95 BPM | OXYGEN SATURATION: 96 % | WEIGHT: 158 LBS | DIASTOLIC BLOOD PRESSURE: 80 MMHG | BODY MASS INDEX: 31.02 KG/M2 | SYSTOLIC BLOOD PRESSURE: 130 MMHG | HEIGHT: 60 IN | TEMPERATURE: 97.2 F

## 2023-05-24 DIAGNOSIS — R20.9 UNSPECIFIED DISTURBANCES OF SKIN SENSATION: ICD-10-CM

## 2023-05-24 DIAGNOSIS — M25.561 PAIN IN RIGHT KNEE: ICD-10-CM

## 2023-05-24 DIAGNOSIS — R05.8 OTHER SPECIFIED COUGH: ICD-10-CM

## 2023-05-24 PROCEDURE — 93000 ELECTROCARDIOGRAM COMPLETE: CPT

## 2023-05-24 PROCEDURE — 99215 OFFICE O/P EST HI 40 MIN: CPT | Mod: 25

## 2023-05-24 RX ORDER — SIMVASTATIN 40 MG/1
40 TABLET, FILM COATED ORAL
Qty: 90 | Refills: 1 | Status: ACTIVE | COMMUNITY
Start: 1900-01-01 | End: 1900-01-01

## 2023-05-24 RX ORDER — CIPROFLOXACIN 3 MG/ML
0.3 SOLUTION OPHTHALMIC 3 TIMES DAILY
Qty: 1 | Refills: 0 | Status: DISCONTINUED | COMMUNITY
Start: 2020-04-03 | End: 2023-05-24

## 2023-05-24 NOTE — PHYSICAL EXAM
[Well Nourished] : well nourished [Well Developed] : well developed [Well-Appearing] : well-appearing [PERRL] : pupils equal round and reactive to light [EOMI] : extraocular movements intact [Normal Outer Ear/Nose] : the outer ears and nose were normal in appearance [Normal Oropharynx] : the oropharynx was normal [No JVD] : no jugular venous distention [No Lymphadenopathy] : no lymphadenopathy [Supple] : supple [Thyroid Normal, No Nodules] : the thyroid was normal and there were no nodules present [No Respiratory Distress] : no respiratory distress  [No Accessory Muscle Use] : no accessory muscle use [Clear to Auscultation] : lungs were clear to auscultation bilaterally [Normal Rate] : normal rate  [Regular Rhythm] : with a regular rhythm [Normal S1, S2] : normal S1 and S2 [No Murmur] : no murmur heard [No Carotid Bruits] : no carotid bruits [No Abdominal Bruit] : a ~M bruit was not heard ~T in the abdomen [No Varicosities] : no varicosities [Pedal Pulses Present] : the pedal pulses are present [No Edema] : there was no peripheral edema [No Palpable Aorta] : no palpable aorta [No Extremity Clubbing/Cyanosis] : no extremity clubbing/cyanosis [No Rash] : no rash [Normal Gait] : normal gait [Deep Tendon Reflexes (DTR)] : deep tendon reflexes were 2+ and symmetric [Normal Affect] : the affect was normal

## 2023-05-26 ENCOUNTER — APPOINTMENT (OUTPATIENT)
Dept: FAMILY MEDICINE | Facility: CLINIC | Age: 68
End: 2023-05-26
Payer: MEDICARE

## 2023-05-26 VITALS
DIASTOLIC BLOOD PRESSURE: 80 MMHG | RESPIRATION RATE: 16 BRPM | WEIGHT: 160 LBS | TEMPERATURE: 97.8 F | OXYGEN SATURATION: 99 % | HEART RATE: 84 BPM | BODY MASS INDEX: 31.41 KG/M2 | HEIGHT: 60 IN | SYSTOLIC BLOOD PRESSURE: 126 MMHG

## 2023-05-26 DIAGNOSIS — Z00.00 ENCOUNTER FOR GENERAL ADULT MEDICAL EXAMINATION W/OUT ABNORMAL FINDINGS: ICD-10-CM

## 2023-05-26 DIAGNOSIS — E78.00 PURE HYPERCHOLESTEROLEMIA, UNSPECIFIED: ICD-10-CM

## 2023-05-26 DIAGNOSIS — F41.9 ANXIETY DISORDER, UNSPECIFIED: ICD-10-CM

## 2023-05-26 PROCEDURE — 99397 PER PM REEVAL EST PAT 65+ YR: CPT | Mod: 25

## 2023-05-26 PROCEDURE — 36415 COLL VENOUS BLD VENIPUNCTURE: CPT

## 2023-05-26 RX ORDER — PANTOPRAZOLE 40 MG/1
40 TABLET, DELAYED RELEASE ORAL DAILY
Qty: 30 | Refills: 5 | Status: ACTIVE | COMMUNITY
Start: 2019-11-26 | End: 1900-01-01

## 2023-05-26 NOTE — ASSESSMENT
[FreeTextEntry1] : Comprehensive blood work ordered.\par Mammogram ordered\par Has stopped screening for cervical cancer.\par Is following up with rheumatologist for osteoporosis,\par Temporarily defers pneumonia vaccine due to current symptoms.\par Chest pain-follow-up with cardiologist.  Pain worsens at all informed patient to have a very low threshold to report to ED. we will attempt to direct schedule an appointment with cardiologist.\par Follow-up in 3 months.\par

## 2023-05-26 NOTE — REVIEW OF SYSTEMS
[Chest Pain] : chest pain [Shortness Of Breath] : shortness of breath [Abdominal Pain] : abdominal pain [Constipation] : constipation [Joint Pain] : joint pain [Joint Stiffness] : joint stiffness [Muscle Weakness] : muscle weakness [Negative] : Heme/Lymph

## 2023-05-26 NOTE — HISTORY OF PRESENT ILLNESS
[de-identified] : 67-year-old female with past medical history of osteoporosis, osteoarthritis, hypertension, anxiety, and hyperlipidemia presents for physical.\par Was recently evaluated by PCP Dr. Austin for chest pains radiating to back.  Chest pain is described as intermittent mild pressure-like pain that at times radiates to back.  Overall symptoms have been persistent for about 1 week.  Has also been suffering from multiple joint pains, upset stomach, and reflux.  EKG was performed by Dr. Austin.  Patient reports that provider informed her that it was unremarkable compared to last (unable to view-likely in the process of being scanned).  She will make an appointment with cardiology.  She is also aware to report to ED if symptoms worsen.  Minimal shortness of breath, CORONADO, diaphoresis, or lightheadedness.\par \par Temporarily declines pneumonia vaccine due to symptoms.\par \par Osteopenia we will follow-up with rheumatologist accordingly.\par \par Continues to suffer from right hip pain that has been worked up by multiple specialist.  Patient has declined surgery for osteoarthritis

## 2023-05-26 NOTE — HEALTH RISK ASSESSMENT
[Poor] : ~his/her~ mood as  poor [Yes] : Yes [2 - 3 times a week (3 pts)] : 2 - 3  times a week (3 points) [1 or 2 (0 pts)] : 1 or 2 (0 points) [Never (0 pts)] : Never (0 points) [No] : In the past 12 months have you used drugs other than those required for medical reasons? No [One fall no injury in past year] : Patient reported one fall in the past year without injury [Patient reported mammogram was normal] : Patient reported mammogram was normal [Patient reported bone density results were abnormal] : Patient reported bone density results were abnormal [Patient reported colonoscopy was normal] : Patient reported colonoscopy was normal [With Patient/Caregiver] : , with patient/caregiver [Name: ___] : Health Care Proxy's Name: [unfilled]  [Relationship: ___] : Relationship: [unfilled] [Audit-CScore] : 3 [Reports changes in hearing] : Reports no changes in hearing [Reports changes in vision] : Reports no changes in vision [MammogramDate] : 08/2022 [BoneDensityDate] : 03/2022 [BoneDensityComments] : Osteopenia [ColonoscopyDate] : 07/2019 [de-identified] :  at times with showering  [de-identified] :  assists ( cant lift things or drive )  [Never] : Never

## 2023-05-26 NOTE — PHYSICAL EXAM
[No Acute Distress] : no acute distress [Well Nourished] : well nourished [Well Developed] : well developed [Well-Appearing] : well-appearing [Normal Sclera/Conjunctiva] : normal sclera/conjunctiva [PERRL] : pupils equal round and reactive to light [EOMI] : extraocular movements intact [Normal Outer Ear/Nose] : the outer ears and nose were normal in appearance [Normal Oropharynx] : the oropharynx was normal [No JVD] : no jugular venous distention [No Lymphadenopathy] : no lymphadenopathy [Supple] : supple [Thyroid Normal, No Nodules] : the thyroid was normal and there were no nodules present [No Respiratory Distress] : no respiratory distress  [No Accessory Muscle Use] : no accessory muscle use [Clear to Auscultation] : lungs were clear to auscultation bilaterally [Normal Rate] : normal rate  [Regular Rhythm] : with a regular rhythm [Normal S1, S2] : normal S1 and S2 [No Murmur] : no murmur heard [No Carotid Bruits] : no carotid bruits [No Abdominal Bruit] : a ~M bruit was not heard ~T in the abdomen [No Varicosities] : no varicosities [Pedal Pulses Present] : the pedal pulses are present [No Edema] : there was no peripheral edema [No Palpable Aorta] : no palpable aorta [No Extremity Clubbing/Cyanosis] : no extremity clubbing/cyanosis [Soft] : abdomen soft [Non Tender] : non-tender [Non-distended] : non-distended [No Masses] : no abdominal mass palpated [No HSM] : no HSM [Normal Bowel Sounds] : normal bowel sounds [Normal Posterior Cervical Nodes] : no posterior cervical lymphadenopathy [Normal Anterior Cervical Nodes] : no anterior cervical lymphadenopathy [No CVA Tenderness] : no CVA  tenderness [No Spinal Tenderness] : no spinal tenderness [No Joint Swelling] : no joint swelling [Grossly Normal Strength/Tone] : grossly normal strength/tone [No Rash] : no rash [Coordination Grossly Intact] : coordination grossly intact [No Focal Deficits] : no focal deficits [Normal Gait] : normal gait [Deep Tendon Reflexes (DTR)] : deep tendon reflexes were 2+ and symmetric [Normal Affect] : the affect was normal [Normal Insight/Judgement] : insight and judgment were intact [de-identified] : Very weak gait-significant limp with applied pressure to the right lower extremity

## 2023-05-31 ENCOUNTER — NON-APPOINTMENT (OUTPATIENT)
Age: 68
End: 2023-05-31

## 2023-05-31 ENCOUNTER — APPOINTMENT (OUTPATIENT)
Dept: CARDIOLOGY | Facility: CLINIC | Age: 68
End: 2023-05-31
Payer: MEDICARE

## 2023-05-31 VITALS
WEIGHT: 157 LBS | OXYGEN SATURATION: 98 % | HEIGHT: 60 IN | SYSTOLIC BLOOD PRESSURE: 140 MMHG | BODY MASS INDEX: 30.82 KG/M2 | DIASTOLIC BLOOD PRESSURE: 67 MMHG | RESPIRATION RATE: 17 BRPM | HEART RATE: 99 BPM

## 2023-05-31 DIAGNOSIS — R07.9 CHEST PAIN, UNSPECIFIED: ICD-10-CM

## 2023-05-31 DIAGNOSIS — I35.1 NONRHEUMATIC AORTIC (VALVE) INSUFFICIENCY: ICD-10-CM

## 2023-05-31 LAB
25(OH)D3 SERPL-MCNC: 42.7 NG/ML
ALBUMIN SERPL ELPH-MCNC: 4.8 G/DL
ALP BLD-CCNC: 87 U/L
ALT SERPL-CCNC: 10 U/L
ANION GAP SERPL CALC-SCNC: 17 MMOL/L
APPEARANCE: CLEAR
AST SERPL-CCNC: 17 U/L
BACTERIA: NEGATIVE /HPF
BILIRUB SERPL-MCNC: 0.4 MG/DL
BILIRUBIN URINE: NEGATIVE
BLOOD URINE: NEGATIVE
BUN SERPL-MCNC: 17 MG/DL
CALCIUM SERPL-MCNC: 10.3 MG/DL
CAST: 0 /LPF
CHLORIDE SERPL-SCNC: 99 MMOL/L
CHOLEST SERPL-MCNC: 232 MG/DL
CO2 SERPL-SCNC: 25 MMOL/L
COLOR: YELLOW
CREAT SERPL-MCNC: 0.76 MG/DL
EGFR: 85 ML/MIN/1.73M2
EPITHELIAL CELLS: 4 /HPF
ESTIMATED AVERAGE GLUCOSE: 114 MG/DL
GLUCOSE QUALITATIVE U: NEGATIVE MG/DL
GLUCOSE SERPL-MCNC: 104 MG/DL
HBA1C MFR BLD HPLC: 5.6 %
HDLC SERPL-MCNC: 93 MG/DL
KETONES URINE: NEGATIVE MG/DL
LDLC SERPL CALC-MCNC: 120 MG/DL
LEUKOCYTE ESTERASE URINE: ABNORMAL
MICROSCOPIC-UA: NORMAL
NITRITE URINE: NEGATIVE
NONHDLC SERPL-MCNC: 139 MG/DL
PH URINE: 6
POTASSIUM SERPL-SCNC: 4.6 MMOL/L
PROT SERPL-MCNC: 7.7 G/DL
PROTEIN URINE: NEGATIVE MG/DL
RED BLOOD CELLS URINE: 1 /HPF
SODIUM SERPL-SCNC: 141 MMOL/L
SPECIFIC GRAVITY URINE: 1.01
T4 FREE SERPL-MCNC: 1.2 NG/DL
TRIGL SERPL-MCNC: 96 MG/DL
TSH SERPL-ACNC: 1.74 UIU/ML
UROBILINOGEN URINE: 0.2 MG/DL
WHITE BLOOD CELLS URINE: 1 /HPF

## 2023-05-31 PROCEDURE — 93000 ELECTROCARDIOGRAM COMPLETE: CPT

## 2023-05-31 PROCEDURE — 99204 OFFICE O/P NEW MOD 45 MIN: CPT | Mod: 25

## 2023-05-31 RX ORDER — LINACLOTIDE 290 UG/1
290 CAPSULE, GELATIN COATED ORAL
Qty: 30 | Refills: 3 | Status: ACTIVE | COMMUNITY
Start: 2019-11-26 | End: 1900-01-01

## 2023-06-04 PROBLEM — R07.9 CHEST PAIN: Status: ACTIVE | Noted: 2023-05-24

## 2023-06-04 NOTE — REASON FOR VISIT
[Symptom and Test Evaluation] : symptom and test evaluation [Structural Heart and Valve Disease] : structural heart and valve disease [Coronary Artery Disease] : coronary artery disease [FreeTextEntry3] : Dr. Guan [FreeTextEntry1] :  JAYMIE BURRIS comes today for evaluation. Jaymie was previously cared at [facility] by Dr. Recio.  She underwent stress testing and echo in the past. She's had some chest pains and back pains and was concerned about G.I. reflux. She underwent surgery of hip at [facility]. Her father suffered an MI at age 60 and a brother had MI at age 50 and she is concerned about her personal risk. She  was advised to undergo a complete cardiac evaluation. She denies current chest pains shortness of breath or loss of consciousnes.\par

## 2023-06-04 NOTE — ASSESSMENT
[FreeTextEntry1] : I have asked Jaymie to undergo detailed cardiac testing in order to evaluate her overall cardiovascular risk.\par An assessment of both structural and functional heart disease was recommended to the patient.\par In this regard, an echocardiogram and possible nuclear stress test versus cardiac CTA were advised to the patient. I await the upcoming noninvasive cardiac testing in order to assess her overall cardiovascular risk.\par We discussed the pros and cons of plain treadmill stress testing nuclear stress testing and angiography including a sensitivity analysis.We discussed current ACC guidelines and the calculated 10 year risk is approximately   10% which is moderately increased\par \par More than half of the face to face encounter of  60 minutes  was spent in counseling the patient with respect to  cardiovascular risk\par \par Quality measures \par Tobacco intervention not indicated\par Statin for prevention of cardiovascular disease probably indicated\par Hypertension compensated\par Aspirin for ischemic vascular disease not indicated\par Tobacco screening cessation and intervention not indicated\par \par Medical necessity\par This is a high encounter based upon two or more chronic illnesses with mild exacerbation requiring further management and evaluation.   .\par \par EKG is indicated for evaluation of chest pain\par \par this patient has a moderate risk for major adverese cardiac events based on [place] risk\par risks benefits alternatives were discussed with the patient.\par all questions were answered to her satisfaction.\par  yet

## 2023-06-04 NOTE — PHYSICAL EXAM
[Well Developed] : well developed [Well Nourished] : well nourished [No Acute Distress] : no acute distress [Normal Conjunctiva] : normal conjunctiva [Normal Venous Pressure] : normal venous pressure [No Carotid Bruit] : no carotid bruit [Normal S1, S2] : normal S1, S2 [No Rub] : no rub [No Gallop] : no gallop [Clear Lung Fields] : clear lung fields [No Respiratory Distress] : no respiratory distress  [Good Air Entry] : good air entry [Soft] : abdomen soft [Non Tender] : non-tender [No Masses/organomegaly] : no masses/organomegaly [Normal Bowel Sounds] : normal bowel sounds [Normal Gait] : normal gait [No Edema] : no edema [No Cyanosis] : no cyanosis [No Clubbing] : no clubbing [No Varicosities] : no varicosities [No Skin Lesions] : no skin lesions [No Rash] : no rash [Moves all extremities] : moves all extremities [No Focal Deficits] : no focal deficits [Normal Speech] : normal speech [Alert and Oriented] : alert and oriented [Normal memory] : normal memory [Murmur] : murmur [de-identified] : 2/6 systolic murmur

## 2023-06-06 ENCOUNTER — NON-APPOINTMENT (OUTPATIENT)
Age: 68
End: 2023-06-06

## 2023-06-06 ENCOUNTER — APPOINTMENT (OUTPATIENT)
Dept: DERMATOLOGY | Facility: CLINIC | Age: 68
End: 2023-06-06
Payer: MEDICARE

## 2023-06-06 PROCEDURE — 17312 MOHS ADDL STAGE: CPT

## 2023-06-06 PROCEDURE — 12042 INTMD RPR N-HF/GENIT2.6-7.5: CPT

## 2023-06-06 PROCEDURE — 17311 MOHS 1 STAGE H/N/HF/G: CPT

## 2023-06-14 ENCOUNTER — APPOINTMENT (OUTPATIENT)
Dept: CARDIOLOGY | Facility: CLINIC | Age: 68
End: 2023-06-14
Payer: MEDICARE

## 2023-06-14 PROCEDURE — 93306 TTE W/DOPPLER COMPLETE: CPT

## 2023-06-20 ENCOUNTER — APPOINTMENT (OUTPATIENT)
Dept: RHEUMATOLOGY | Facility: CLINIC | Age: 68
End: 2023-06-20
Payer: MEDICARE

## 2023-06-20 VITALS
RESPIRATION RATE: 14 BRPM | TEMPERATURE: 98.3 F | HEIGHT: 60 IN | OXYGEN SATURATION: 97 % | HEART RATE: 93 BPM | SYSTOLIC BLOOD PRESSURE: 138 MMHG | BODY MASS INDEX: 30.63 KG/M2 | WEIGHT: 156 LBS | DIASTOLIC BLOOD PRESSURE: 84 MMHG

## 2023-06-20 DIAGNOSIS — M54.2 CERVICALGIA: ICD-10-CM

## 2023-06-20 DIAGNOSIS — M54.9 DORSALGIA, UNSPECIFIED: ICD-10-CM

## 2023-06-20 DIAGNOSIS — G89.29 CERVICALGIA: ICD-10-CM

## 2023-06-20 DIAGNOSIS — R07.81 PLEURODYNIA: ICD-10-CM

## 2023-06-20 PROCEDURE — 99215 OFFICE O/P EST HI 40 MIN: CPT

## 2023-06-21 NOTE — REVIEW OF SYSTEMS
[Arthralgias] : arthralgias [Joint Pain] : joint pain [As Noted in HPI] : as noted in HPI [Difficulty Walking] : difficulty walking [Negative] : Gastrointestinal [Joint Swelling] : no joint swelling [Joint Stiffness] : no joint stiffness

## 2023-06-21 NOTE — ASSESSMENT
[FreeTextEntry1] : INGRID BURRIS is a 67 year old woman with severe OP by MRI with multiple compression fractures and no metabolic etiology on extensive w/u with prior endocrinologist. DEXA with only osteopenia in setting of spinal DJD which is affecting the read thus unreliable as follow-up imaging for BMD assessment. Unable to take PO bisphosphonates 2/2 esophageal issues, fractured on short trial of Prolia, thus Evenity chosen for bone building ability, has now completed recommended course of 12 months. Follow-up maintenance OP therapy had been on hold 2/2 dental sx ongoing and then pt nonadherance. Then developed b/l sacral insufficiency fractures. \par \par # OP with issues as above\par - extensive discsusion on need to resume Prolia otherwise might lose (if not has lost already) any improvement from the evenity. Pt is more amenable, will get PA and have back for administration\par - will get endo 2nd opinion as well\par -[facility] doctors had recommended repeat MM work-up, advised her to follow-up with heme to repeat\par - c/w Ca/Vit D supplementation-- she can get dietary Ca to equal 600mg BID, but strongly encourage supplementation of Vit D with goal of 1607-4974 IU daily \par - recent labs stable, repeat prior to next visit\par \par # Worsening R  hip pain, known advanced OA, sx on hold 2/2 may be complicated 2/2 above\par - agree with ortho concerns about healing in setting of her OP, recommended Prolia to resume as above \par - use walker at all times to ambulate \par - c/w OTC pain meds\par - will trial low dose cymbalta QHS to see if helps with OA pain. R/B/A reviewed with patient including potential SE of GI upset, sedation, SI. Advised to stop medication immediately if any SI and call office. Pt expressed understanding \par \par # C/T spine acute on chronic pain with radiation to ribs, ?fracture\par - PT referral for this and hip\par - if not improving then can get XRs below - rationale for delay 2/2 unlikely to change mgmt plan and pt has already had extensive radiation exposure from imaging with multiple doctors \par \par RTC for Prolia injection

## 2023-06-21 NOTE — PHYSICAL EXAM
[General Appearance - Alert] : alert [General Appearance - In No Acute Distress] : in no acute distress [General Appearance - Well Nourished] : well nourished [Sclera] : the sclera and conjunctiva were normal [Neck Appearance] : the appearance of the neck was normal [Auscultation Breath Sounds / Voice Sounds] : lungs were clear to auscultation bilaterally [Heart Rate And Rhythm] : heart rate was normal and rhythm regular [Heart Sounds] : normal S1 and S2 [Edema] : there was no peripheral edema [No CVA Tenderness] : no ~M costovertebral angle tenderness [] : no rash [No Focal Deficits] : no focal deficits [Oriented To Time, Place, And Person] : oriented to person, place, and time [Impaired Insight] : insight and judgment were intact [Affect] : the affect was normal [FreeTextEntry1] : Brouchards and Heberdens nodes over b/l hands, no synovitis. Antalgic gait, ambulating with walker, can't weight bear on R hip

## 2023-06-29 NOTE — HISTORY OF PRESENT ILLNESS
[FreeTextEntry8] : +chest  pain for couple days in front on right side and across back . She walks with walker  and felt she pulled muscle. her home BP was 180/89. She saw cardiologist last year in [place].occasional dizziness on changing position. She ran out of statin couple months ago for HLD . She has HTN.c/o dry cough. She has BCC of rigth neck and needs skin doctor referral.c/o cold extremities.

## 2023-06-29 NOTE — HEALTH RISK ASSESSMENT
[Never] : Never [No] : In the past 12 months have you used drugs other than those required for medical reasons? No [No falls in past year] : Patient reported no falls in the past year [0] : 2) Feeling down, depressed, or hopeless: Not at all (0) [PHQ-2 Negative - No further assessment needed] : PHQ-2 Negative - No further assessment needed [de-identified] : regular

## 2023-06-29 NOTE — PLAN
[FreeTextEntry1] : f/u cardiology.\par  cough med.\par  low s=chol. low salt diet.HTN f/u , meds, monitor at home.\par

## 2023-06-30 ENCOUNTER — APPOINTMENT (OUTPATIENT)
Dept: RHEUMATOLOGY | Facility: CLINIC | Age: 68
End: 2023-06-30
Payer: MEDICARE

## 2023-06-30 PROCEDURE — 96401 CHEMO ANTI-NEOPL SQ/IM: CPT

## 2023-06-30 RX ORDER — CYCLOBENZAPRINE HYDROCHLORIDE 5 MG/1
5 TABLET, FILM COATED ORAL
Qty: 60 | Refills: 1 | Status: ACTIVE | COMMUNITY
Start: 2021-01-28 | End: 1900-01-01

## 2023-06-30 RX ORDER — DENOSUMAB 60 MG/ML
60 INJECTION SUBCUTANEOUS
Qty: 1 | Refills: 0 | Status: COMPLETED | OUTPATIENT
Start: 2023-06-30

## 2023-06-30 RX ADMIN — DENOSUMAB 0 MG/ML: 60 INJECTION SUBCUTANEOUS at 00:00

## 2023-06-30 NOTE — ASSESSMENT
[FreeTextEntry1] : INGRID BURRIS is a 67 year old woman with severe OP by MRI with multiple compression fractures and no metabolic etiology on extensive w/u with prior endocrinologist. DEXA with only osteopenia in setting of spinal DJD which is affecting the read thus unreliable as follow-up imaging for BMD assessment. Unable to take PO bisphosphonates 2/2 esophageal issues, fractured on short trial of Prolia, thus Evenity chosen for bone building ability, has now completed recommended course of 12 months. Follow-up maintenance OP therapy had been on hold 2/2 dental sx ongoing and then pt nonadherance. Then developed b/l sacral insufficiency fractures. \par \par # OP with issues as above\par - extensive discussion on need to resume Prolia otherwise might lose (if not has lost already) any improvement from the evenity\par - s/p Prolia today, post procedure instructions provided \par - will get endo 2nd opinion as well --has appt \par - [facility] doctors had recommended repeat MM work-up, advised her to follow-up with heme to repeat\par - c/w Ca/Vit D supplementation-- she can get dietary Ca to equal 600mg BID, but strongly encourage supplementation of Vit D with goal of 3913-7955 IU daily \par - recent labs stable, further w/u per endo, otherwise will repeat at next visit \par \par # Worsening R  hip pain, known advanced OA, sx on hold 2/2 may be complicated 2/2 above\par - agree with ortho concerns about healing in setting of her OP, recommended Prolia to resume as above \par - use walker at all times to ambulate \par - c/w OTC pain meds\par - will trial low dose cymbalta QHS to see if helps with OA pain. R/B/A reviewed with patient including potential SE of GI upset, sedation, SI. Advised to stop medication immediately if any SI and call office. Pt expressed understanding \par \par # C/T spine acute on chronic pain with radiation to ribs, ?fracture\par - PT referral for this and hip\par - if not improving then can get XRs below - rationale for delay 2/2 unlikely to change mgmt plan and pt has already had extensive radiation exposure from imaging with multiple doctors \par \par RTC for next Prolia injection, sooner if needs uptitration of Cymbalta

## 2023-08-01 ENCOUNTER — APPOINTMENT (OUTPATIENT)
Dept: ENDOCRINOLOGY | Facility: CLINIC | Age: 68
End: 2023-08-01

## 2023-09-26 ENCOUNTER — APPOINTMENT (OUTPATIENT)
Dept: ENDOCRINOLOGY | Facility: CLINIC | Age: 68
End: 2023-09-26
Payer: MEDICARE

## 2023-09-26 VITALS
SYSTOLIC BLOOD PRESSURE: 120 MMHG | HEART RATE: 103 BPM | WEIGHT: 156 LBS | HEIGHT: 60 IN | OXYGEN SATURATION: 97 % | DIASTOLIC BLOOD PRESSURE: 80 MMHG | BODY MASS INDEX: 30.63 KG/M2

## 2023-09-26 VITALS — BODY MASS INDEX: 31.87 KG/M2 | HEIGHT: 58.5 IN | WEIGHT: 156 LBS

## 2023-09-26 PROCEDURE — 99204 OFFICE O/P NEW MOD 45 MIN: CPT | Mod: 25

## 2023-09-26 PROCEDURE — ZZZZZ: CPT

## 2023-09-26 PROCEDURE — 77080 DXA BONE DENSITY AXIAL: CPT

## 2023-09-27 RX ORDER — CLOTRIMAZOLE AND BETAMETHASONE DIPROPIONATE 10; .5 MG/G; MG/G
1-0.05 CREAM TOPICAL
Qty: 1 | Refills: 4 | Status: DISCONTINUED | COMMUNITY
Start: 2019-01-02 | End: 2023-09-27

## 2023-09-27 RX ORDER — DENOSUMAB 60 MG/ML
60 INJECTION SUBCUTANEOUS
Qty: 1 | Refills: 0 | Status: DISCONTINUED | COMMUNITY
Start: 2021-03-16 | End: 2023-09-27

## 2023-09-27 RX ORDER — DULOXETINE HYDROCHLORIDE 30 MG/1
30 CAPSULE, DELAYED RELEASE PELLETS ORAL
Qty: 30 | Refills: 2 | Status: DISCONTINUED | COMMUNITY
Start: 2023-06-20 | End: 2023-09-27

## 2023-09-27 RX ORDER — ESTRADIOL 0.1 MG/G
0.1 CREAM VAGINAL
Qty: 1 | Refills: 3 | Status: DISCONTINUED | COMMUNITY
Start: 2018-06-05 | End: 2023-09-27

## 2023-09-27 RX ORDER — POLYETHYLENE GLYCOL 3350, SODIUM SULFATE, SODIUM CHLORIDE, POTASSIUM CHLORIDE, ASCORBIC ACID, SODIUM ASCORBATE 7.5-2.691G
100 KIT ORAL
Qty: 1 | Refills: 0 | Status: DISCONTINUED | COMMUNITY
Start: 2019-05-07 | End: 2023-09-27

## 2023-09-27 RX ORDER — BENZONATATE 100 MG/1
100 CAPSULE ORAL 3 TIMES DAILY
Qty: 21 | Refills: 0 | Status: DISCONTINUED | COMMUNITY
Start: 2023-05-24 | End: 2023-09-27

## 2023-10-24 ENCOUNTER — APPOINTMENT (OUTPATIENT)
Dept: CT IMAGING | Facility: CLINIC | Age: 68
End: 2023-10-24

## 2024-01-02 ENCOUNTER — APPOINTMENT (OUTPATIENT)
Dept: RHEUMATOLOGY | Facility: CLINIC | Age: 69
End: 2024-01-02
Payer: MEDICARE

## 2024-01-02 VITALS
WEIGHT: 156 LBS | DIASTOLIC BLOOD PRESSURE: 88 MMHG | HEIGHT: 58.5 IN | SYSTOLIC BLOOD PRESSURE: 128 MMHG | BODY MASS INDEX: 31.87 KG/M2 | TEMPERATURE: 97.7 F | HEART RATE: 88 BPM | RESPIRATION RATE: 14 BRPM | OXYGEN SATURATION: 98 %

## 2024-01-02 DIAGNOSIS — M81.0 AGE-RELATED OSTEOPOROSIS W/OUT CURRENT PATHOLOGICAL FRACTURE: ICD-10-CM

## 2024-01-02 DIAGNOSIS — M17.0 BILATERAL PRIMARY OSTEOARTHRITIS OF KNEE: ICD-10-CM

## 2024-01-02 DIAGNOSIS — M16.11 UNILATERAL PRIMARY OSTEOARTHRITIS, RIGHT HIP: ICD-10-CM

## 2024-01-02 PROCEDURE — 96401 CHEMO ANTI-NEOPL SQ/IM: CPT

## 2024-01-02 PROCEDURE — 99214 OFFICE O/P EST MOD 30 MIN: CPT | Mod: 25

## 2024-01-02 RX ORDER — DENOSUMAB 60 MG/ML
60 INJECTION SUBCUTANEOUS
Qty: 1 | Refills: 0 | Status: COMPLETED | OUTPATIENT
Start: 2024-01-02

## 2024-01-02 RX ADMIN — DENOSUMAB 0 MG/ML: 60 INJECTION SUBCUTANEOUS at 00:00

## 2024-01-02 NOTE — PHYSICAL EXAM
No [General Appearance - Alert] : alert [General Appearance - In No Acute Distress] : in no acute distress [General Appearance - Well Nourished] : well nourished [Sclera] : the sclera and conjunctiva were normal [Neck Appearance] : the appearance of the neck was normal [Auscultation Breath Sounds / Voice Sounds] : lungs were clear to auscultation bilaterally [Heart Rate And Rhythm] : heart rate was normal and rhythm regular [Heart Sounds] : normal S1 and S2 [Edema] : there was no peripheral edema [No CVA Tenderness] : no ~M costovertebral angle tenderness [] : no rash [No Focal Deficits] : no focal deficits [Oriented To Time, Place, And Person] : oriented to person, place, and time [Impaired Insight] : insight and judgment were intact [Affect] : the affect was normal [Musculoskeletal - Swelling] : no joint swelling seen [FreeTextEntry1] : Brouchards and Heberdens nodes over b/l hands, no synovitis. Antalgic gait, ambulating with walker, can't weight bear on R hip

## 2024-01-02 NOTE — ASSESSMENT
[FreeTextEntry1] : INGRID BURRIS is a 68 year old woman with severe OP by MRI with multiple compression fractures and no metabolic etiology on extensive w/u with prior endocrinologist. DEXA with only osteopenia in setting of spinal DJD which is affecting the read thus unreliable as follow-up imaging for BMD assessment. Unable to take PO bisphosphonates 2/2 esophageal issues, fractured on short trial of Prolia, thus Evenity chosen for bone building ability, has now completed recommended course of 12 months. Follow-up maintenance OP therapy had been on hold 2/2 dental sx ongoing and then pt nonadherance. Then developed b/l sacral insufficiency fractures. Now back on Prolia, here for next dose, defering THR for now.   # OP with issues as above - s/p Prolia today, post procedure instructions provided - aware to notify me and oral surgeon if any dental sx planned  - Endo eval appreciated, updated DEXA in 9/2023 so next to be done on/after 9/2025, will plan for Evenity with Endo if THR is planned, otherwise will proceed with Prolia  - check CMP, Vit D levels now  - c/w Ca/Vit D supplementation-- she can get dietary Ca to equal 600mg BID, but strongly encouraged again consistent supplementation of Vit D with goal of 0028-6507 IU daily  # Worsening R hip pain, known advanced OA, sx on hold 2/2 may be complicated 2/2 above, R knee pain 2/2 this # C/T spine acute on chronic pain  - PT referral  - use walker at all times to ambulate - c/w OTC pain meds - didn't feel cymbalta helped, off it now. Using muscle relaxer very sparingly   RTC 6 months for next Prolia injection and OA re-eval

## 2024-01-02 NOTE — HISTORY OF PRESENT ILLNESS
[FreeTextEntry1] : INGRID BURRIS is a 66 year old woman who presents with advanced OP as seen on MRI with multiple atraumatic spinal compression fx with severe loss of height. Used Fosamax in the past but has esophageal motility issues which preclude it presently, trialled one dose of Prolia but had subsequent fracture, so transitioned to Evenity SQ with prior Endo, Dr Constantine Cabrera x 5 doses, then completed the full 12 months with me, after which MRI appears stable.   + strong FH of severe OP with fragility fx, no tobacco, recurrent steroids for bronchitis, no inflammatory arthritis  + OA with nodules over DIPs in b/l hands which are painful, inquiring about possible meds as cannot take PO NSAIDs 2/2 GI issues.   ---------- 1/28/21 -- 2 weeks of R sided buttock with radiation to the groin, occasional radicular sx, occasional groin numbness, occasional R foot numbness. No preceding trauma. Initial few steps from arising with limp, but walking improves thereafter. Some lower spine worsening pain as well. No falls. Ongoing suprapubic pressure/mild pain, no dysuria. + R anterior shin pain chronically. Ongoing dental sx for implants, OP meds on hold for this.   3/16/21 -- Worsening hip, buttock pain on R which then moved to L as well with limited ROM in interim, MRI pelvis with b/l sacral insufficiency fx. Presently can only ambulate short distances with a walker, pain with lying flat, able to sit without severe pain. S/p bone graft but has not had implant placed yet. PT on hold given MRI results.   11/9/21 -- Has been tolerating Prolia well, due for next dose today. No new fractures since last visit. Worsening R hip pain in setting of known advanced OA, planning for possible THR, pain not too severe with ambulation but very severe at night, asking about possible medication mgmt until sx is completed. Surgeon is trying to ID if its back, sacral and/or hip pain to better determine which sx is indicated. Dental implants also on hold 2/2 Prolia, asking about timeline. Has stopped taking supplemental Ca/Vit D as feels its too constipating, trying to get it in her diet.   5/12/22 -- Here for Prolia injection but very worried it might not be effective and wanting to discuss how to best assess and other options if needed. No trips/falls since last visit, ongoing R hip pain and prepping for surgery now, pain radiating down to femur, knee and shin and she is very worried she has a fracture along these areas. DEXA results were raised at surgery visit which has also worried her.   6/20/23 -- Last Prolia May 2022 as pt decided she didn't feel it was helping. Progressive debility 2/2 unable to bear weight on R hip, needs walker at all times, saw [facility] ortho, neuro and had repeat full w/u with MRIs again showing severe DJD/compression in spine diffusely and pt has lost some more height, as well as advanced R hip OA, EMG negative for neurologic process. Ortho advised sx but also cautioned her about poor possible outcomes 2/2 her comorbidities, so she has held off. Newer upper back/mid back radiating to rib pain for which she returns.   1/2/24 -- Saw Endo, deferring THR so not planning for Evenity, would like to thus proceed with Prolia until she determines if she will reconsider THR. No falls, needs walker at all times, continues to do ADLs independantly for most part. Never got to start PT but would like to try. No plans for dental sx. Not consistently taking Vit D.

## 2024-01-02 NOTE — PROCEDURE
[Today's Date:] : Date: [unfilled] [FreeTextEntry1] : Pt feels well today, no recent infections, fevers/chills. Prolia 60mg administered subcutaneously into L upper arm after site cleansed with alcohol. Pt tolerated well, no leakage of medication from injection site, dressed with Band-Aid. \par  \par  Pt educated that site may be sore post-injection, she could experience mild malaise. Advised to call office if develops any pain, swelling, redness at site, new fevers or chills.

## 2024-03-14 ENCOUNTER — APPOINTMENT (OUTPATIENT)
Dept: FAMILY MEDICINE | Facility: CLINIC | Age: 69
End: 2024-03-14
Payer: MEDICARE

## 2024-03-14 VITALS
TEMPERATURE: 97.6 F | HEART RATE: 90 BPM | WEIGHT: 160 LBS | HEIGHT: 58.5 IN | SYSTOLIC BLOOD PRESSURE: 125 MMHG | RESPIRATION RATE: 17 BRPM | BODY MASS INDEX: 32.69 KG/M2 | DIASTOLIC BLOOD PRESSURE: 82 MMHG | OXYGEN SATURATION: 97 %

## 2024-03-14 DIAGNOSIS — H61.20 IMPACTED CERUMEN, UNSPECIFIED EAR: ICD-10-CM

## 2024-03-14 DIAGNOSIS — Z12.83 ENCOUNTER FOR SCREENING FOR MALIGNANT NEOPLASM OF SKIN: ICD-10-CM

## 2024-03-14 DIAGNOSIS — G89.29 LOW BACK PAIN, UNSPECIFIED: ICD-10-CM

## 2024-03-14 DIAGNOSIS — M25.561 PAIN IN RIGHT KNEE: ICD-10-CM

## 2024-03-14 DIAGNOSIS — M25.551 PAIN IN RIGHT HIP: ICD-10-CM

## 2024-03-14 DIAGNOSIS — M54.50 LOW BACK PAIN, UNSPECIFIED: ICD-10-CM

## 2024-03-14 DIAGNOSIS — R35.0 FREQUENCY OF MICTURITION: ICD-10-CM

## 2024-03-14 DIAGNOSIS — I10 ESSENTIAL (PRIMARY) HYPERTENSION: ICD-10-CM

## 2024-03-14 DIAGNOSIS — M25.562 PAIN IN RIGHT KNEE: ICD-10-CM

## 2024-03-14 DIAGNOSIS — M25.552 PAIN IN RIGHT HIP: ICD-10-CM

## 2024-03-14 PROCEDURE — 99215 OFFICE O/P EST HI 40 MIN: CPT

## 2024-03-15 LAB
APPEARANCE: CLEAR
BILIRUBIN URINE: NEGATIVE
BLOOD URINE: NEGATIVE
COLOR: NORMAL
GLUCOSE QUALITATIVE U: NEGATIVE
KETONES URINE: NEGATIVE
LEUKOCYTE ESTERASE URINE: NEGATIVE
NITRITE URINE: NEGATIVE
PH URINE: 6
PROTEIN URINE: NEGATIVE
SPECIFIC GRAVITY URINE: 1.02
UROBILINOGEN URINE: NORMAL

## 2024-03-15 NOTE — PHYSICAL EXAM
[No Acute Distress] : no acute distress [Well Nourished] : well nourished [EOMI] : extraocular movements intact [Supple] : supple [Clear to Auscultation] : lungs were clear to auscultation bilaterally [Normal Rate] : normal rate  [Non Tender] : non-tender [No CVA Tenderness] : no CVA  tenderness [No Rash] : no rash [Normal Affect] : the affect was normal [de-identified] : excess cerumen in both ears [de-identified] : walker bound

## 2024-03-15 NOTE — ASSESSMENT
[FreeTextEntry1] : Approximately  45 min of face to face time spent, reviewed chart, old labwork results, addressed various health concerns, ordered necessary new labs, referrals for follow up. Answered all pt questions, coordinated care for patient.

## 2024-03-15 NOTE — HISTORY OF PRESENT ILLNESS
[FreeTextEntry8] : Ms. Jaymie Kelly is a 67 yo female presents today for symptoms of pelvic discomfort, urinary frequency, and dull ache in the back for the last week. Pt denies fever, chills, currently, nausea, vomiting. Pt also reports clicking in right ear, which she would like to get evaluated. Pt also seeks referral to dermatology, to follow up for her skin cancer check.  And also seeks referral to orthopedist for her knee and hip pain.

## 2024-04-08 ENCOUNTER — NON-APPOINTMENT (OUTPATIENT)
Age: 69
End: 2024-04-08

## 2024-05-05 ENCOUNTER — NON-APPOINTMENT (OUTPATIENT)
Age: 69
End: 2024-05-05

## 2024-06-04 ENCOUNTER — RX RENEWAL (OUTPATIENT)
Age: 69
End: 2024-06-04

## 2024-06-04 RX ORDER — NAPROXEN 500 MG/1
500 TABLET ORAL
Qty: 60 | Refills: 1 | Status: ACTIVE | COMMUNITY
Start: 2021-01-28 | End: 1900-01-01

## 2024-06-07 ENCOUNTER — APPOINTMENT (OUTPATIENT)
Dept: PLASTIC SURGERY | Facility: CLINIC | Age: 69
End: 2024-06-07
Payer: MEDICARE

## 2024-06-07 PROCEDURE — 99203 OFFICE O/P NEW LOW 30 MIN: CPT

## 2024-06-07 PROCEDURE — 99213 OFFICE O/P EST LOW 20 MIN: CPT

## 2024-06-10 RX ORDER — ASPIRIN 81 MG
6.5 TABLET, DELAYED RELEASE (ENTERIC COATED) ORAL
Qty: 1 | Refills: 1 | Status: COMPLETED | COMMUNITY
Start: 2024-03-14 | End: 2024-06-10

## 2024-06-10 RX ORDER — CIPROFLOXACIN HYDROCHLORIDE 500 MG/1
500 TABLET, FILM COATED ORAL
Qty: 10 | Refills: 0 | Status: COMPLETED | COMMUNITY
Start: 2024-03-14 | End: 2024-06-10

## 2024-06-10 NOTE — HISTORY OF PRESENT ILLNESS
[FreeTextEntry1] : 67 year old F presents for Mohs surgery for a nodular and infiltrative BCC of the upper cutaneous lip biopsied on 04/29/24 after a routine skin evaluation patient.  Reports that she did not notice a lesion prior to the skin check because she has skin that breaks out frequently and gets dry and crusty so it did not seem abnormal to her patient denies bleeding DOP 06/06/23 with Dr Dhaliwal, Mohs surgery for nodular and infiltrative BCC of the R submandibular neck Patient is not on blood thinners and she does not smoke cigarettes PMH: Hypertension osteoarthritis osteoporosis and GERD

## 2024-06-11 ENCOUNTER — NON-APPOINTMENT (OUTPATIENT)
Age: 69
End: 2024-06-11

## 2024-06-11 ENCOUNTER — APPOINTMENT (OUTPATIENT)
Dept: DERMATOLOGY | Facility: CLINIC | Age: 69
End: 2024-06-11
Payer: MEDICARE

## 2024-06-11 ENCOUNTER — APPOINTMENT (OUTPATIENT)
Dept: PLASTIC SURGERY | Facility: CLINIC | Age: 69
End: 2024-06-11
Payer: MEDICARE

## 2024-06-11 DIAGNOSIS — Z85.828 PERSONAL HISTORY OF OTHER MALIGNANT NEOPLASM OF SKIN: ICD-10-CM

## 2024-06-11 DIAGNOSIS — C44.41 BASAL CELL CARCINOMA OF SKIN OF SCALP AND NECK: ICD-10-CM

## 2024-06-11 PROCEDURE — 17311 MOHS 1 STAGE H/N/HF/G: CPT

## 2024-06-11 PROCEDURE — 14060 TIS TRNFR E/N/E/L 10 SQ CM/<: CPT

## 2024-06-11 NOTE — REASON FOR VISIT
[FreeTextEntry1] : Mohs for excisional of bcc and closure of defect and reconstruction of upper lip.

## 2024-06-11 NOTE — PROCEDURE
[FreeTextEntry6] : preop dx:  right cheek and upper wound postop dx: same procedure: cervicofacial flap 3x2cm upper lip summary: IC obtained.  lido w/ epi injected.  dissection performed in the subcutaneous plane using sharp scissors.  flap elevated and advanced 3x2cm.   closed in multiple layers using monocryl sutures.  steris placed flap appeared viable.

## 2024-06-18 ENCOUNTER — APPOINTMENT (OUTPATIENT)
Dept: PLASTIC SURGERY | Facility: CLINIC | Age: 69
End: 2024-06-18
Payer: MEDICARE

## 2024-06-18 DIAGNOSIS — C44.01 BASAL CELL CARCINOMA OF SKIN OF LIP: ICD-10-CM

## 2024-06-18 PROCEDURE — 99024 POSTOP FOLLOW-UP VISIT: CPT

## 2024-06-18 NOTE — HISTORY OF PRESENT ILLNESS
[FreeTextEntry1] : DOP 06/11/24 Mohs for excisional of bcc and closure of defect and reconstruction of upper lip

## 2024-07-05 ENCOUNTER — APPOINTMENT (OUTPATIENT)
Dept: RHEUMATOLOGY | Facility: CLINIC | Age: 69
End: 2024-07-05
Payer: MEDICARE

## 2024-07-05 VITALS
SYSTOLIC BLOOD PRESSURE: 126 MMHG | HEIGHT: 58.5 IN | DIASTOLIC BLOOD PRESSURE: 82 MMHG | TEMPERATURE: 97.8 F | RESPIRATION RATE: 16 BRPM | OXYGEN SATURATION: 97 % | BODY MASS INDEX: 32.69 KG/M2 | HEART RATE: 88 BPM | WEIGHT: 160 LBS

## 2024-07-05 DIAGNOSIS — M81.0 AGE-RELATED OSTEOPOROSIS W/OUT CURRENT PATHOLOGICAL FRACTURE: ICD-10-CM

## 2024-07-05 DIAGNOSIS — M16.11 UNILATERAL PRIMARY OSTEOARTHRITIS, RIGHT HIP: ICD-10-CM

## 2024-07-05 DIAGNOSIS — M17.0 BILATERAL PRIMARY OSTEOARTHRITIS OF KNEE: ICD-10-CM

## 2024-07-05 PROCEDURE — 96401 CHEMO ANTI-NEOPL SQ/IM: CPT

## 2024-07-05 PROCEDURE — 99214 OFFICE O/P EST MOD 30 MIN: CPT | Mod: 25

## 2024-07-05 RX ORDER — DENOSUMAB 60 MG/ML
60 INJECTION SUBCUTANEOUS
Qty: 1 | Refills: 0 | Status: COMPLETED | OUTPATIENT
Start: 2024-07-05

## 2024-07-05 RX ADMIN — DENOSUMAB 0 MG/ML: 60 INJECTION SUBCUTANEOUS at 00:00

## 2024-07-15 ENCOUNTER — APPOINTMENT (OUTPATIENT)
Dept: FAMILY MEDICINE | Facility: CLINIC | Age: 69
End: 2024-07-15

## 2024-08-23 ENCOUNTER — NON-APPOINTMENT (OUTPATIENT)
Age: 69
End: 2024-08-23

## 2024-08-26 ENCOUNTER — APPOINTMENT (OUTPATIENT)
Dept: ORTHOPEDIC SURGERY | Facility: CLINIC | Age: 69
End: 2024-08-26
Payer: MEDICARE

## 2024-08-26 VITALS — WEIGHT: 160 LBS | HEIGHT: 59 IN | BODY MASS INDEX: 32.25 KG/M2

## 2024-08-26 DIAGNOSIS — M16.11 UNILATERAL PRIMARY OSTEOARTHRITIS, RIGHT HIP: ICD-10-CM

## 2024-08-26 PROCEDURE — 73502 X-RAY EXAM HIP UNI 2-3 VIEWS: CPT | Mod: RT

## 2024-08-26 PROCEDURE — 99204 OFFICE O/P NEW MOD 45 MIN: CPT

## 2024-08-26 PROCEDURE — 73564 X-RAY EXAM KNEE 4 OR MORE: CPT | Mod: RT

## 2024-08-26 NOTE — HISTORY OF PRESENT ILLNESS
[de-identified] : 69-year-old female with presenting complaint of pain right hip pain pain right knee.  2 to 3 years ago patient began to experience chronic intermittent pain lateral aspect right hip increasing severity when in bed at night.  Pain radiates into the anterior knee.  Patient reports that she cannot "put my foot out".  Patient has been undergoing physical therapy for the past 4 months with some symptom improvement and was prescribed duloxetine.  Medical history of "collapsing" spine.  She has undergone consultation for right hip and lumbar spine and [facility].  Presents for second opinion regarding right hip

## 2024-08-26 NOTE — PHYSICAL EXAM
[de-identified] : Constitutional:Well nourished , well developed and in no acute distress Psychiatric: Alert and oriented to time place and person.Appropriate affect  Skin:Head, neck, arms and lower extremities:no lesions or discoloration HEENT: Normocephalic, EOM intact, Nasal septum midline, Respiratory: Unlabored respirations,no audible wheezing ,no tachypnea, no cyanosis Cardiovascular: no leg swelling  no ankle edema no JVD, pulse regular Vascular: no calf or thigh tenderness,  Peripheral pulses; intact Lymphatics:No groin adenopathy,no lymphedema lower  or upper extremities Right hip logroll right hip provokes typical lateral hip and knee pain Right knee exam within normal limits [de-identified] : X-ray AP pelvis right hip reveals end-stage right hip osteoarthritis bone-on-bone. X-ray right knee multiple views weightbearing focal subchondral depression lateral femoral groove

## 2024-08-26 NOTE — DISCUSSION/SUMMARY
[de-identified] : Impression end-stage osteoarthritis right hip with referred pain to right knee Recommendation I have reviewed nonoperative management including modalities such as platelet rich plasma, intra-articular steroid injection oral medication.  I advised patient that she is not a good candidate for steroid injection.  I have also explained that platelet rich plasma injections are unpredictable not withstanding that they are not covered by insurance and may be costly.  I have advised patient to follow-up with her rheumatologist for oral medication.  Ultimately patient advised that she would be considered a candidate for total hip replacementWhen she feels quality of life is sufficiently compromised and symptoms are not adequately improved in response to nonoperative management

## 2024-08-28 ENCOUNTER — APPOINTMENT (OUTPATIENT)
Dept: FAMILY MEDICINE | Facility: CLINIC | Age: 69
End: 2024-08-28

## 2024-10-02 DIAGNOSIS — R53.81 OTHER MALAISE: ICD-10-CM

## 2025-03-13 ENCOUNTER — APPOINTMENT (OUTPATIENT)
Dept: RHEUMATOLOGY | Facility: CLINIC | Age: 70
End: 2025-03-13

## 2025-03-13 ENCOUNTER — APPOINTMENT (OUTPATIENT)
Dept: FAMILY MEDICINE | Facility: CLINIC | Age: 70
End: 2025-03-13

## 2025-03-13 VITALS
TEMPERATURE: 97.4 F | DIASTOLIC BLOOD PRESSURE: 90 MMHG | BODY MASS INDEX: 32.46 KG/M2 | RESPIRATION RATE: 16 BRPM | HEART RATE: 94 BPM | HEIGHT: 59 IN | OXYGEN SATURATION: 98 % | WEIGHT: 161 LBS | SYSTOLIC BLOOD PRESSURE: 138 MMHG

## 2025-03-13 DIAGNOSIS — I10 ESSENTIAL (PRIMARY) HYPERTENSION: ICD-10-CM

## 2025-03-13 DIAGNOSIS — E78.00 PURE HYPERCHOLESTEROLEMIA, UNSPECIFIED: ICD-10-CM

## 2025-03-13 DIAGNOSIS — M15.9 POLYOSTEOARTHRITIS, UNSPECIFIED: ICD-10-CM

## 2025-03-13 DIAGNOSIS — Z96.0 PRESENCE OF UROGENITAL IMPLANTS: ICD-10-CM

## 2025-03-13 DIAGNOSIS — C44.41 BASAL CELL CARCINOMA OF SKIN OF SCALP AND NECK: ICD-10-CM

## 2025-03-13 DIAGNOSIS — H53.8 OTHER VISUAL DISTURBANCES: ICD-10-CM

## 2025-03-13 DIAGNOSIS — I35.1 NONRHEUMATIC AORTIC (VALVE) INSUFFICIENCY: ICD-10-CM

## 2025-03-13 DIAGNOSIS — M81.0 AGE-RELATED OSTEOPOROSIS W/OUT CURRENT PATHOLOGICAL FRACTURE: ICD-10-CM

## 2025-03-13 DIAGNOSIS — R22.40 LOCALIZED SWELLING, MASS AND LUMP, UNSPECIFIED LOWER LIMB: ICD-10-CM

## 2025-03-13 PROCEDURE — G2211 COMPLEX E/M VISIT ADD ON: CPT

## 2025-03-13 PROCEDURE — G0439: CPT

## 2025-03-13 PROCEDURE — 36415 COLL VENOUS BLD VENIPUNCTURE: CPT

## 2025-03-13 PROCEDURE — 96401 CHEMO ANTI-NEOPL SQ/IM: CPT

## 2025-03-13 PROCEDURE — 99214 OFFICE O/P EST MOD 30 MIN: CPT | Mod: 25

## 2025-03-18 LAB
25(OH)D3 SERPL-MCNC: 37.1 NG/ML
ALBUMIN SERPL ELPH-MCNC: 4.6 G/DL
ALP BLD-CCNC: 78 U/L
ALT SERPL-CCNC: 9 U/L
ANION GAP SERPL CALC-SCNC: 16 MMOL/L
APPEARANCE: CLEAR
AST SERPL-CCNC: 16 U/L
BACTERIA UR CULT: NORMAL
BACTERIA: ABNORMAL /HPF
BASOPHILS # BLD AUTO: 0.07 K/UL
BASOPHILS NFR BLD AUTO: 1.2 %
BILIRUB SERPL-MCNC: 0.4 MG/DL
BILIRUBIN URINE: NEGATIVE
BLOOD URINE: NEGATIVE
BUN SERPL-MCNC: 17 MG/DL
CALCIUM SERPL-MCNC: 9.9 MG/DL
CAST: 1 /LPF
CHLORIDE SERPL-SCNC: 104 MMOL/L
CHOLEST SERPL-MCNC: 264 MG/DL
CO2 SERPL-SCNC: 23 MMOL/L
COLOR: YELLOW
CREAT SERPL-MCNC: 0.84 MG/DL
EGFRCR SERPLBLD CKD-EPI 2021: 74 ML/MIN/1.73M2
EOSINOPHIL # BLD AUTO: 0.07 K/UL
EOSINOPHIL NFR BLD AUTO: 1.2 %
EPITHELIAL CELLS: 14 /HPF
ESTIMATED AVERAGE GLUCOSE: 105 MG/DL
GLUCOSE QUALITATIVE U: NEGATIVE MG/DL
GLUCOSE SERPL-MCNC: 100 MG/DL
HBA1C MFR BLD HPLC: 5.3 %
HCT VFR BLD CALC: 38.9 %
HDLC SERPL-MCNC: 96 MG/DL
HGB BLD-MCNC: 12.6 G/DL
IMM GRANULOCYTES NFR BLD AUTO: 0.2 %
KETONES URINE: NEGATIVE MG/DL
LDLC SERPL CALC-MCNC: 155 MG/DL
LEUKOCYTE ESTERASE URINE: ABNORMAL
LYMPHOCYTES # BLD AUTO: 1.73 K/UL
LYMPHOCYTES NFR BLD AUTO: 28.7 %
MAN DIFF?: NORMAL
MCHC RBC-ENTMCNC: 30.8 PG
MCHC RBC-ENTMCNC: 32.4 G/DL
MCV RBC AUTO: 95.1 FL
MICROSCOPIC-UA: NORMAL
MONOCYTES # BLD AUTO: 0.45 K/UL
MONOCYTES NFR BLD AUTO: 7.5 %
NEUTROPHILS # BLD AUTO: 3.7 K/UL
NEUTROPHILS NFR BLD AUTO: 61.2 %
NITRITE URINE: NEGATIVE
NONHDLC SERPL-MCNC: 168 MG/DL
PH URINE: 5.5
PLATELET # BLD AUTO: 285 K/UL
POTASSIUM SERPL-SCNC: 4.4 MMOL/L
PROT SERPL-MCNC: 7.6 G/DL
PROTEIN URINE: NEGATIVE MG/DL
RBC # BLD: 4.09 M/UL
RBC # FLD: 12.8 %
RED BLOOD CELLS URINE: 1 /HPF
SODIUM SERPL-SCNC: 143 MMOL/L
SPECIFIC GRAVITY URINE: 1.02
TRIGL SERPL-MCNC: 81 MG/DL
TSH SERPL-ACNC: 1.47 UIU/ML
UROBILINOGEN URINE: 0.2 MG/DL
WBC # FLD AUTO: 6.03 K/UL
WHITE BLOOD CELLS URINE: 3 /HPF

## 2025-05-28 NOTE — HISTORY OF PRESENT ILLNESS
What Type Of Note Output Would You Prefer (Optional)?: Bullet Format What Is The Reason For Today's Visit?: Full Body Skin Examination What Is The Reason For Today's Visit? (Being Monitored For X): concerning skin lesions on an annual basis [FreeTextEntry1] : INGRID BURRIS is a 64 year old woman with advanced OP as seen on MRI with multiple atraumatic spinal compression fx with severe loss of height. Used Fosamax in the past but has esophageal motility issues which preclude it presently, trialled one dose of Prolia but had subsequent fracture, so transitioned to Evenity SQ with prior Endo, Dr Constantine Cabrera x 6 doses now. Last dose administered with me, tolerated well, no SE. Here today for next set of injections. No F/C, recent infections, feels well, no CP, SOB. Pain in back is improving slowly, has been wearing a back brace. No recent falls. Asking about disability parking forms as prolonged walking causes severe pain. \par \par + strong FH of severe OP with fragility fx, no tobacco, recurrent steroids for bronchitis, no inflammatory arthritis\par \par + OA with nodules over DIPs in b/l hands which are painful, inquiring about possible meds as cannot take PO NSAIDs 2/2 GI issues.

## 2025-07-10 ENCOUNTER — APPOINTMENT (OUTPATIENT)
Dept: CARDIOLOGY | Facility: CLINIC | Age: 70
End: 2025-07-10
Payer: MEDICARE

## 2025-07-10 VITALS
HEART RATE: 108 BPM | SYSTOLIC BLOOD PRESSURE: 136 MMHG | OXYGEN SATURATION: 95 % | HEIGHT: 59 IN | DIASTOLIC BLOOD PRESSURE: 62 MMHG | WEIGHT: 153 LBS | BODY MASS INDEX: 30.84 KG/M2

## 2025-07-10 PROCEDURE — 99214 OFFICE O/P EST MOD 30 MIN: CPT | Mod: 25

## 2025-07-10 PROCEDURE — 93000 ELECTROCARDIOGRAM COMPLETE: CPT

## 2025-07-10 RX ORDER — ROSUVASTATIN CALCIUM 5 MG/1
5 TABLET, FILM COATED ORAL
Qty: 90 | Refills: 1 | Status: ACTIVE | COMMUNITY
Start: 2025-07-10 | End: 1900-01-01

## 2025-07-23 ENCOUNTER — APPOINTMENT (OUTPATIENT)
Dept: CARDIOLOGY | Facility: CLINIC | Age: 70
End: 2025-07-23
Payer: MEDICARE

## 2025-07-23 PROCEDURE — 93306 TTE W/DOPPLER COMPLETE: CPT

## 2025-09-16 ENCOUNTER — APPOINTMENT (OUTPATIENT)
Dept: RHEUMATOLOGY | Facility: CLINIC | Age: 70
End: 2025-09-16
Payer: MEDICARE

## 2025-09-16 VITALS
HEIGHT: 59 IN | HEART RATE: 90 BPM | DIASTOLIC BLOOD PRESSURE: 88 MMHG | WEIGHT: 153 LBS | SYSTOLIC BLOOD PRESSURE: 128 MMHG | TEMPERATURE: 97.6 F | OXYGEN SATURATION: 98 % | RESPIRATION RATE: 16 BRPM | BODY MASS INDEX: 30.84 KG/M2

## 2025-09-16 DIAGNOSIS — M25.561 PAIN IN RIGHT KNEE: ICD-10-CM

## 2025-09-16 DIAGNOSIS — M25.562 PAIN IN RIGHT KNEE: ICD-10-CM

## 2025-09-16 DIAGNOSIS — R53.81 OTHER MALAISE: ICD-10-CM

## 2025-09-16 DIAGNOSIS — M16.11 UNILATERAL PRIMARY OSTEOARTHRITIS, RIGHT HIP: ICD-10-CM

## 2025-09-16 DIAGNOSIS — M47.816 SPONDYLOSIS W/OUT MYELOPATHY OR RADICULOPATHY, LUMBAR REGION: ICD-10-CM

## 2025-09-16 DIAGNOSIS — M81.0 AGE-RELATED OSTEOPOROSIS W/OUT CURRENT PATHOLOGICAL FRACTURE: ICD-10-CM

## 2025-09-16 DIAGNOSIS — M79.10 MYALGIA, UNSPECIFIED SITE: ICD-10-CM

## 2025-09-16 DIAGNOSIS — E55.9 VITAMIN D DEFICIENCY, UNSPECIFIED: ICD-10-CM

## 2025-09-16 PROCEDURE — 99214 OFFICE O/P EST MOD 30 MIN: CPT | Mod: 25

## 2025-09-16 PROCEDURE — 96401 CHEMO ANTI-NEOPL SQ/IM: CPT

## 2025-09-16 RX ORDER — DICLOFENAC SODIUM 10 MG/G
1 GEL TOPICAL
Qty: 1 | Refills: 3 | Status: ACTIVE | COMMUNITY
Start: 2025-09-16 | End: 1900-01-01

## 2025-09-16 RX ORDER — DENOSUMAB 60 MG/ML
60 INJECTION SUBCUTANEOUS
Qty: 0 | Refills: 0 | Status: COMPLETED | OUTPATIENT
Start: 2025-09-16

## 2025-09-16 RX ADMIN — DENOSUMAB 0 MG/ML: 60 INJECTION SUBCUTANEOUS at 00:00
